# Patient Record
Sex: FEMALE | Race: WHITE | Employment: UNEMPLOYED | URBAN - METROPOLITAN AREA
[De-identification: names, ages, dates, MRNs, and addresses within clinical notes are randomized per-mention and may not be internally consistent; named-entity substitution may affect disease eponyms.]

---

## 2017-07-21 LAB
ANTIBODY SCREEN, EXTERNAL: NEGATIVE
CHLAMYDIA, EXTERNAL: NEGATIVE
HBSAG, EXTERNAL: NEGATIVE
HCT, EXTERNAL: 37.9
HGB, EXTERNAL: 12.4
HIV, EXTERNAL: NEGATIVE
N. GONORRHEA, EXTERNAL: NEGATIVE
RUBELLA, EXTERNAL: NORMAL
T. PALLIDUM, EXTERNAL: NORMAL
TYPE, ABO & RH, EXTERNAL: NORMAL

## 2017-08-03 ENCOUNTER — HOSPITAL ENCOUNTER (OUTPATIENT)
Dept: PERINATAL CARE | Age: 22
Discharge: HOME | End: 2017-08-03
Attending: OBSTETRICS & GYNECOLOGY
Payer: MEDICAID

## 2017-08-03 PROCEDURE — 76811 OB US DETAILED SNGL FETUS: CPT | Performed by: OBSTETRICS & GYNECOLOGY

## 2017-08-31 ENCOUNTER — HOSPITAL ENCOUNTER (OUTPATIENT)
Dept: PERINATAL CARE | Age: 22
Discharge: HOME | End: 2017-08-31
Attending: OBSTETRICS & GYNECOLOGY
Payer: MEDICAID

## 2017-08-31 PROCEDURE — 76816 OB US FOLLOW-UP PER FETUS: CPT | Performed by: OBSTETRICS & GYNECOLOGY

## 2017-08-31 PROCEDURE — 76817 TRANSVAGINAL US OBSTETRIC: CPT | Performed by: OBSTETRICS & GYNECOLOGY

## 2017-09-01 NOTE — PROGRESS NOTES
Maternal-Fetal Medicine    U/S on 8-31-17 completed to follow-up fetal spine evaluation. Final report pending. 8-31-17 -  g (58%ile), BELLA 14 cm, anterior placenta. Soft-tissue mass (sessile appearance, base 10 mm x 7 mm, protruding away from skin about 12 mm) originating from near the sacrum is identified. Some images suggest fluid sac inside -- possibly concerning for hygroma vs. menginocele -- but no clear attachment to spine or CNS is found. Some views suggest continuation of mass under the skin, possibly suggesting teratoma, but also not easily imaged. The ultrasound is completed remotely. I have reviewed her study in real-time and discussed the findings with Ms. Pebbles Mendez and her . Due to the non-diagnostic ultrasound imaging, I have arranged for MRI of the fetus. The MRI study is scheduled for today (9-1-17, [facility] radiology, 6:15 pm). I have contacted Ms. Pebbles Mendez ([phone]) and discussed the MRI evaluation with her, and she will arrange to arrive at [facility] for evaluation this evening. Additional follow-up and appropriate pediatric service consultations will be arranged after further evaluation is completed.     Johanna Bowen M.D., Ph.D.  Marcelino Clifton

## 2017-09-03 NOTE — PROGRESS NOTES
MARIA E - Please see the Ultrasound report / consult note to be entered into this patient's record as a scanned document from my office. A text copy of this note is also provided below for convenience. Bonifacio Cornejo M.D., Ph.D.  [facility]    ---------------------------------------------------------    Indication: Abnormal ultrasound   testing B76.6, Obesity complicating pregnancy 2nd Tri O99.212.   ____________________________________________________________________________  History: Age: 20 years. : 1 Para: 0.   Current Pregnancy: Blood group: O Rhesus D-positive. Pre- pregnancy data: Weight 270 lbs. Height 5 ft 3 ins. BMI 47.9.  ____________________________________________________________________________  Dating:  Stated EDC:  EDC: 17 GA by stated EDC: 25w1d  Current Scan on: 17 EDC: 12/10/17 GA by current scan: 25w4d  Best Overall Assessment: 17 EDC: 17 Assessed GA: 25w1d  The calculation of the gestational age by current scan was based on BPD, HC, AC and FL. The Best Overall Assessment is based on the stated EDC.  ____________________________________________________________________________  General Evaluation:  Fetal heart activity: present. Fetal heart rate: 144 bpm.   Presentation: BREECH. Fetal movement: visible. Amniotic Fluid: normal. BELLA  14.0 cm. Maximal vertical pocket 5.0 cm. Placenta: anterior. ____________________________________________________________________________  Anatomy Scan:  Ricardo gestation. Biometry:  Fetal Measurements used for the estimation of the gestational age are bolded.   BPD 63.4 mm 61st% 25w5d (25w0d to 26w3d)  .7 mm 41st% 25w4d (23w3d to 27w4d)  .0 mm 78th% 26w3d (25w4d to 27w1d)  FL 45.1 mm 29th% 24w6d (22w6d to 27w0d)  OFD 83.8 mm 68th% 25w4d  TCD 27.9 mm 63rd% 25w4d  CM 6.2 mm 51st%  EFW (lbs/oz) 1 lbs 14 ozs  EFW (g) 844 g  58th%       Fetal Anatomy:  Visualized with normal appearance: head, spine, gastrointestinal tract, bladder. Brain: Visualized and normal appearance: brain parenchyma, cerebral ventricles, choroid plexus, Cisterna Magna, midline falx, cerebellum, cerebellar lobes, posterior fossa, vermis, cavum septi pellucidi. Neck / Skin: Soft-tissue mass (sessile appearance, base 10 mm x 7 mm, protruding away from skin for about 12 mm) originating from near the sacrum is identified. Some images suggest fluid sac inside -- possibly concerning for hygroma vs. menginocele. No clear attachment to spine or CNS is found, but the imaging is not clear, and such as attachment to the CNS cannot be ruled out. Some views suggest continuation of mass under the skin, possibly suggesting teratoma, but this is also not easily imaged. Heart: The 4-chamber view was obtained but outflow tracts could not be adequately visualized. Summary of Ultrasound Findings:  Transabdominal US. U/S machine: PlayDo E8 Expert. U/S view: good. Impression: Lower back / sacral mass noted. ____________________________________________________________________________  Fetal Wellbeing Assessment:  Amniotic fluid: normal. BELLA: 14.0 cm. MVP: 5.0 cm. Q1: 3.1 cm. Q2: 5.0 cm. Q3: 3.2 cm. Q4: 2.7 cm.     ____________________________________________________________________________  Consultation:  Consultant: Dr. Christy Taylor  Ms. Haywood's ultrasound findings were reviewed with her and her partner by telephone in the office today. (She was scheduled for follow-up on a  date/time when an [facility] physician was not in the office. Her obstetrical ultrasound is evaluated remotely in real-time during her study, and telephone conversation was performed immediately after the study.)    Summary of the study (provided as EMR note on 8-31-17): \" g (58%ile), BELLA 14 cm, anterior placenta.   Soft-tissue mass (sessile appearance, base 10 mm x 7 mm, protruding away from skin about 12 mm) originating from near the sacrum is identified. Some images suggest fluid sac inside -- possibly concerning for hygroma vs. meningocele -- but no clear attachment to spine or CNS is found. Some views suggest continuation of mass under the skin, possibly suggesting teratoma, but also not easily imaged. \"Due to the non-diagnostic ultrasound imaging, I have arranged for MRI of the fetus. The MRI study is scheduled for today (17, [facility] radiology, 6:15 pm). I have contacted Ms. Jordan Thibodeaux ([phone]) and discussed the MRI evaluation with her, and she will arrange to arrive at [facility] for evaluation this evening. \"Additional follow-up and appropriate pediatric service consultations will be arranged after further evaluation is completed. \"    -----    I discussed these findings with Ms. Jordan Thibodeaux today (by telephone) and emphasized the presence of these fetal findings appear to be progressive were not present to this extent and they were not detected at the time of her 21-week study in our office. I have reviewed her prior films, when emphasis was on the sacral spine and CNS. I indicated that the current findings are not expected to be life threatening for the fetus/. I indicated that the mass is expected to require surgical correction and that on-going fetal evaluation will assist in preparations for the timing, route, and location of the delivery. I discussed the possibility of sacral teratoma and neural anomaly. Masses in this location of a fetus are expected to be benign, but sometimes surgical resection may have be complicated and result in deficits in the child. At the time of our discussion in the office, the fetal MRI appointment had not been made. Later the same day, I was able to make the appointment for fetal MRI for 2017 at [facility] as noted above. Thank you, very much, for this opportunity to provide consultation for your patient.   If you have any questions or concerns, please do not hesitate to contact our office at your convenience.  ____________________________________________________________________________  Maternal Structures:  Cervical length 45.0 mm.  ____________________________________________________________________________  Report Summary:  Impression: A follow-up study was performed for fetal evaluation and growth. Transabdominal and endovaginal ultrasound evaluations were performed to better evaluate the fetal sacral spine. Image quality is limited by obesity. Ms. Pebbles Mendez is obese and was seen at 21 weeks because of abnormal fetal ultrasound findings (suspected sacral spine anomaly). The evaluation at 21 weeks did not observe any abnormality, but follow-up was arranged to allow for additional evaluation due to the difficulty of prior imaging. Ms. Pebbles Mendez has low risk for fetal Trisomy 21/18 and ONTD (1 in 2734 risk) based on second-trimester serum screening. She has no complaints to report to my staff in the office. This study is remotely interpreted but is evaluated in real-time during the patient's visit to my office. Please see the note above. Single viable breech IUP with composite biometry consistent with dates is observed. EFW is appropriate for gestational age. Fetal anatomy is a soft-tissue mass near the sacrum of the fetus. The mass has a sessile appearance with the base measuring 10 mm x 7 mm and which protrudes from the skin for a length of about 12 mm). Some images suggest fluid sac inside -- possibly concerning for a hygroma vs. meningocele -- but no clear attachment to spine or CNS is found. Some views suggest continuation of mass under the skin, possibly suggesting teratoma, but also not easily imaged. The fetal brain appears normal including a normal evaluation of the posterior fossa. The sacral spine appears normal with no major anomaly, but all anomalies of the sacrum cannot be ruled out presently. Normal fetal movements and amniotic fluid measurements are noted. Recommendations: 1. I have discussed the need for additional evaluation of the fetus and discussed fetal MRI evaluation. I have arranged for MRI evaluation of the fetus. The MRI study is scheduled for Friday (9-1-17, [facility] radiology, 6:15 pm). I have contacted Ms. Jolly oHoper ([phone]) and discussed the MRI evaluation with her, and she will arrange to arrive at [facility] for evaluation this evening. She is provided information to arrange for change of time/date of her appointment if she desires. My office will continue to communicate with the patient regarding the on-going fetal evaluation. 2.  Fetal follow-up by ultrasound in 4 weeks.     9-3-17 - ADDENDUM - FINAL REPORT POSTED ON THIS DATE - [facility]

## 2017-09-08 ENCOUNTER — HOSPITAL ENCOUNTER (OUTPATIENT)
Dept: MRI IMAGING | Age: 22
Discharge: HOME | End: 2017-09-08
Attending: OBSTETRICS & GYNECOLOGY
Payer: MEDICAID

## 2017-09-08 DIAGNOSIS — O35.9XX1: ICD-10-CM

## 2017-09-08 PROCEDURE — 72195 MRI PELVIS W/O DYE: CPT

## 2017-09-08 PROCEDURE — 74712 MRI FETAL SNGL/1ST GESTATION: CPT

## 2017-09-25 LAB — GTT, 1 HR, GLUCOLA, EXTERNAL: 112

## 2017-09-28 ENCOUNTER — HOSPITAL ENCOUNTER (OUTPATIENT)
Dept: PERINATAL CARE | Age: 22
Discharge: HOME | End: 2017-09-28
Attending: OBSTETRICS & GYNECOLOGY
Payer: MEDICAID

## 2017-09-28 PROCEDURE — 76816 OB US FOLLOW-UP PER FETUS: CPT | Performed by: OBSTETRICS & GYNECOLOGY

## 2017-10-30 ENCOUNTER — HOSPITAL ENCOUNTER (OUTPATIENT)
Dept: PERINATAL CARE | Age: 22
Discharge: HOME | End: 2017-10-30
Attending: OBSTETRICS & GYNECOLOGY
Payer: MEDICAID

## 2017-10-30 PROCEDURE — 76816 OB US FOLLOW-UP PER FETUS: CPT | Performed by: OBSTETRICS & GYNECOLOGY

## 2017-11-24 LAB — GRBS, EXTERNAL: POSITIVE

## 2017-11-28 ENCOUNTER — HOSPITAL ENCOUNTER (OUTPATIENT)
Dept: PERINATAL CARE | Age: 22
Discharge: HOME | End: 2017-11-28
Payer: MEDICAID

## 2017-11-28 PROCEDURE — 76816 OB US FOLLOW-UP PER FETUS: CPT | Performed by: OBSTETRICS & GYNECOLOGY

## 2017-12-11 ENCOUNTER — HOSPITAL ENCOUNTER (INPATIENT)
Age: 22
LOS: 4 days | Discharge: HOME | DRG: 540 | End: 2017-12-15
Attending: STUDENT IN AN ORGANIZED HEALTH CARE EDUCATION/TRAINING PROGRAM | Admitting: STUDENT IN AN ORGANIZED HEALTH CARE EDUCATION/TRAINING PROGRAM
Payer: MEDICAID

## 2017-12-11 PROBLEM — Z34.90 PREGNANCY: Status: ACTIVE | Noted: 2017-12-11

## 2017-12-11 LAB
ALBUMIN SERPL-MCNC: 1.9 G/DL (ref 3.5–5)
ALBUMIN/GLOB SERPL: 0.5 {RATIO} (ref 1.1–2.2)
ALP SERPL-CCNC: 149 U/L (ref 45–117)
ALT SERPL-CCNC: 16 U/L (ref 12–78)
ANION GAP SERPL CALC-SCNC: 14 MMOL/L (ref 5–15)
AST SERPL-CCNC: 17 U/L (ref 15–37)
BASOPHILS # BLD: 0 K/UL (ref 0–0.1)
BASOPHILS NFR BLD: 0 % (ref 0–1)
BILIRUB SERPL-MCNC: 0.1 MG/DL (ref 0.2–1)
BUN SERPL-MCNC: 6 MG/DL (ref 6–20)
BUN/CREAT SERPL: 11 (ref 12–20)
CALCIUM SERPL-MCNC: 8.1 MG/DL (ref 8.5–10.1)
CHLORIDE SERPL-SCNC: 105 MMOL/L (ref 97–108)
CO2 SERPL-SCNC: 20 MMOL/L (ref 21–32)
CREAT SERPL-MCNC: 0.56 MG/DL (ref 0.55–1.02)
CREAT UR-MCNC: 26.31 MG/DL
EOSINOPHIL # BLD: 0.1 K/UL (ref 0–0.4)
EOSINOPHIL NFR BLD: 1 % (ref 0–7)
ERYTHROCYTE [DISTWIDTH] IN BLOOD BY AUTOMATED COUNT: 13.7 % (ref 11.5–14.5)
GLOBULIN SER CALC-MCNC: 3.8 G/DL (ref 2–4)
GLUCOSE SERPL-MCNC: 80 MG/DL (ref 65–100)
HCT VFR BLD AUTO: 33 % (ref 35–47)
HGB BLD-MCNC: 10.9 G/DL (ref 11.5–16)
LYMPHOCYTES # BLD: 1.7 K/UL (ref 0.8–3.5)
LYMPHOCYTES NFR BLD: 17 % (ref 12–49)
MCH RBC QN AUTO: 27.7 PG (ref 26–34)
MCHC RBC AUTO-ENTMCNC: 33 G/DL (ref 30–36.5)
MCV RBC AUTO: 84 FL (ref 80–99)
MONOCYTES # BLD: 0.6 K/UL (ref 0–1)
MONOCYTES NFR BLD: 6 % (ref 5–13)
NEUTS SEG # BLD: 7.5 K/UL (ref 1.8–8)
NEUTS SEG NFR BLD: 76 % (ref 32–75)
PLATELET # BLD AUTO: 249 K/UL (ref 150–400)
POTASSIUM SERPL-SCNC: 4.3 MMOL/L (ref 3.5–5.1)
PROT SERPL-MCNC: 5.7 G/DL (ref 6.4–8.2)
PROT UR-MCNC: 26 MG/DL (ref 0–11.9)
PROT/CREAT UR-RTO: 1
RBC # BLD AUTO: 3.93 M/UL (ref 3.8–5.2)
SODIUM SERPL-SCNC: 139 MMOL/L (ref 136–145)
WBC # BLD AUTO: 9.9 K/UL (ref 3.6–11)

## 2017-12-11 PROCEDURE — 86900 BLOOD TYPING SEROLOGIC ABO: CPT | Performed by: STUDENT IN AN ORGANIZED HEALTH CARE EDUCATION/TRAINING PROGRAM

## 2017-12-11 PROCEDURE — 65270000029 HC RM PRIVATE

## 2017-12-11 PROCEDURE — 85025 COMPLETE CBC W/AUTO DIFF WBC: CPT | Performed by: STUDENT IN AN ORGANIZED HEALTH CARE EDUCATION/TRAINING PROGRAM

## 2017-12-11 PROCEDURE — 84156 ASSAY OF PROTEIN URINE: CPT | Performed by: STUDENT IN AN ORGANIZED HEALTH CARE EDUCATION/TRAINING PROGRAM

## 2017-12-11 PROCEDURE — 36415 COLL VENOUS BLD VENIPUNCTURE: CPT | Performed by: STUDENT IN AN ORGANIZED HEALTH CARE EDUCATION/TRAINING PROGRAM

## 2017-12-11 PROCEDURE — 77030005546 HC CATH URETH FOL 3W BARD -A

## 2017-12-11 PROCEDURE — 80053 COMPREHEN METABOLIC PANEL: CPT | Performed by: STUDENT IN AN ORGANIZED HEALTH CARE EDUCATION/TRAINING PROGRAM

## 2017-12-11 RX ORDER — SODIUM CHLORIDE 0.9 % (FLUSH) 0.9 %
5-10 SYRINGE (ML) INJECTION AS NEEDED
Status: DISCONTINUED | OUTPATIENT
Start: 2017-12-11 | End: 2017-12-13

## 2017-12-11 RX ORDER — ACETAMINOPHEN 325 MG/1
500 TABLET ORAL AS NEEDED
COMMUNITY
End: 2017-12-15

## 2017-12-11 RX ORDER — OXYTOCIN IN 5 % DEXTROSE 30/500 ML
.5-3 PLASTIC BAG, INJECTION (ML) INTRAVENOUS
Status: DISCONTINUED | OUTPATIENT
Start: 2017-12-12 | End: 2017-12-13

## 2017-12-11 RX ORDER — ZOLPIDEM TARTRATE 5 MG/1
5 TABLET ORAL
Status: DISCONTINUED | OUTPATIENT
Start: 2017-12-11 | End: 2017-12-11 | Stop reason: SDUPTHER

## 2017-12-11 RX ORDER — ACETAMINOPHEN 325 MG/1
650 TABLET ORAL
Status: DISCONTINUED | OUTPATIENT
Start: 2017-12-11 | End: 2017-12-13

## 2017-12-11 RX ORDER — BUTORPHANOL TARTRATE 1 MG/ML
1 INJECTION INTRAMUSCULAR; INTRAVENOUS
Status: DISCONTINUED | OUTPATIENT
Start: 2017-12-11 | End: 2017-12-13

## 2017-12-11 RX ORDER — ONDANSETRON 2 MG/ML
4 INJECTION INTRAMUSCULAR; INTRAVENOUS
Status: DISCONTINUED | OUTPATIENT
Start: 2017-12-11 | End: 2017-12-13 | Stop reason: HOSPADM

## 2017-12-11 RX ORDER — FLUOXETINE 10 MG/1
30 CAPSULE ORAL DAILY
COMMUNITY
End: 2018-10-01 | Stop reason: ALTCHOICE

## 2017-12-11 RX ORDER — SODIUM CHLORIDE 0.9 % (FLUSH) 0.9 %
5-10 SYRINGE (ML) INJECTION EVERY 8 HOURS
Status: DISCONTINUED | OUTPATIENT
Start: 2017-12-11 | End: 2017-12-13

## 2017-12-11 RX ORDER — SODIUM CHLORIDE, SODIUM LACTATE, POTASSIUM CHLORIDE, CALCIUM CHLORIDE 600; 310; 30; 20 MG/100ML; MG/100ML; MG/100ML; MG/100ML
125 INJECTION, SOLUTION INTRAVENOUS CONTINUOUS
Status: DISCONTINUED | OUTPATIENT
Start: 2017-12-11 | End: 2017-12-13

## 2017-12-11 RX ORDER — ZOLPIDEM TARTRATE 5 MG/1
5 TABLET ORAL
Status: DISCONTINUED | OUTPATIENT
Start: 2017-12-11 | End: 2017-12-13

## 2017-12-11 RX ORDER — OXYTOCIN IN 5 % DEXTROSE 30/500 ML
.5-2 PLASTIC BAG, INJECTION (ML) INTRAVENOUS
Status: DISCONTINUED | OUTPATIENT
Start: 2017-12-11 | End: 2017-12-11

## 2017-12-11 RX ORDER — ACETAMINOPHEN 325 MG/1
650 TABLET ORAL
Status: DISCONTINUED | OUTPATIENT
Start: 2017-12-11 | End: 2017-12-11 | Stop reason: SDUPTHER

## 2017-12-11 RX ADMIN — Medication 10 ML: at 20:01

## 2017-12-11 NOTE — PROGRESS NOTES
1700 - Patient arrived ambulatory from home for IOL r/t increased BP in office.  39w5d gestation. Patient changed into gown and voided for PCR collection. Patient placed on EFM. PIV inserted, labs drawn and sent, PIV flushed. Consents signed. Patient tolerated well. Patient oriented to room. Callbell within reach. 200 - Dr. Janice Parikh at bedside assessing patient. SVE = 1/70/-2. Mohr bulb catheter placed by Dr. Janice Parikh and inflated by this RN with 60mL NS. Patient tolerated well. US adjusted. Amol Vargas from Dr. Janice Parikh for patient to have regular diet until midnight then clear liquids, PCN to being at midnight for GBS+ status, pitocin administration to begin at 0600 tomorrow, monitor x1hr, as long as reassuring FHR EFM can be DC until NST is needed before pitocin administration in AM. Patient agreeable to [address]. Callbell within reach. 1822 - Blood bank called requesting second pink top for type and screen, patient does not need a type and screen at this time, blood bank instructed RN to not change order just no send second pink top if not needed. 1845 - EFM DC as reactive NST noted and reassuring FHR tracing for the last hour. BP reading taken, will continue to monitor. 1908 - Bedside and Verbal shift change report given to Babak Larios RN (oncoming nurse) by Shayna Andrade RN (offgoing nurse). Report included the following information SBAR, Procedure Summary, Intake/Output, MAR, Recent Results and Med Rec Status.

## 2017-12-11 NOTE — H&P
History & Physical    Name: Deepika Malone MRN: [id number]  SSN: [id number]    YOB: 1995  Age: 20 y.o. Sex: female      Subjective: Induction     Estimated Date of Delivery: 17  OB History      Para Term  AB Living    1         SAB TAB Ectopic Molar Multiple Live Births                   Ms. Joycelyn Loyola is admitted with pregnancy at 39w5d for induction of labor due to elevated BPs at term. Prenatal course was complicated by morbid obesity, smoking, fetus with sacral growth, s/p MRI does not appear to involve spinal column. Please see prenatal records for details. Presented to the office today with elevated BPs and intermittent HA. Past Medical History:   Diagnosis Date    Psychiatric problem     depression and OCD     History reviewed. No pertinent surgical history. Social History     Occupational History    Not on file. Social History Main Topics    Smoking status: Current Every Day Smoker     Packs/day: 0.25     Years: 6.00    Smokeless tobacco: Not on file    Alcohol use No    Drug use: No    Sexual activity: Yes     Partners: Male     Birth control/ protection: None     History reviewed. No pertinent family history. No Known Allergies  Prior to Admission medications    Medication Sig Start Date End Date Taking? Authorizing Provider   FLUoxetine (PROZAC) 10 mg capsule Take 30 mg by mouth daily. Indications: Obsessive-Compulsive Disorder, depression   Yes Historical Provider   acetaminophen (TYLENOL) 325 mg tablet Take 500 mg by mouth as needed for Pain. Yes Historical Provider        Review of Systems: A comprehensive review of systems was negative except for that written in the History of Present Illness. Objective:     Vitals:  Vitals:    17 1716   Weight: 139.7 kg (308 lb)   Height: 5' 2\" (1.575 m)        Physical Exam:  Patient without distress.   Heart: Regular rate and rhythm  Lung: normal respiratory effort  Abdomen: soft, nontender, obese Fundus: soft and non tender  Perineum: blood absent, amniotic fluid absent  Cervical Exam: 1 cm dilated    70% effaced    -2 station    Lower Extremities:  - Edema 1+  Membranes:  Intact  Fetal Heart Rate: Reactive  Baseline: 145 per minute  Variability: moderate  Accelerations: yes  Decelerations: none  Uterine contractions: none          Prenatal Labs:   Rh pos, Rubella immune, GBS pos     Impression/Plan:   21yo G1 at 39w 5d     Plan: Admit for induction of labor. Group B Strep positive, will treat prophylactically with penicillin first dose at midnight.     Consents reviewed and signed, questions answered     Elevated BPs -sending labs CBC, CMP, Pr:Cr ratio  Mohr bulb for cervical ripening, pitocin in AM   Morbid obesity (BMI 54)   Fetal sacral abnormality -possible teratoma, MRI neg for spinal column involvement, needs evaluation by peds after delivery     Signed By:  Loyda Farmer DO     December 11, 2017

## 2017-12-11 NOTE — IP AVS SNAPSHOT
Summary of Care Report The Summary of Care report has been created to help improve care coordination. Users with access to [employer] or [address] (Web-based application) may access additional patient information including the Discharge Summary. If you are not currently a [address] user and need more information, please call the number listed below in the [place] [address] section and ask to be connected with Medical Records. Facility Information Name Address Phone [address] [facility] [address] [phone] Patient Information Patient Name Sex ZACH Callejas ([id number]) Female 1995 Discharge Information Admitting Provider Service Area Unit Anuradha Carranza,  / [phone] [address] [facility] 3 Mother Infant / [phone] Discharge Provider Discharge Date/Time Discharge Disposition Destination (none) 12/15/2017 Evening (Pending) AHR (none) Patient Language Language ENGLISH [13] Hospital Problems as of 12/15/2017  Reviewed: 3/6/2012 10:29 AM by Tiffanie Helms Noted - Resolved Last Modified POA Active Problems Pregnancy  2017 - Present 2017 by Anuradha Carranza,  Unknown Entered by Anuradha Carranza,  Non-Hospital Problems as of 12/15/2017  Reviewed: 3/6/2012 10:29 AM by Mikki Helms Noted - Resolved Last Modified Active Problems Seasonal allergic reaction  2010 - Present 2010 by Bianca Hill Entered by Bianca Salt Obese  2010 - Present 2010 by Bianca Hill Entered by Bianca Hill You are allergic to the following No active allergies Current Discharge Medication List  
  
START taking these medications Dose & Instructions Dispensing Information Comments HYDROcodone-acetaminophen 5-325 mg per tablet Commonly known as:  Cale Robbins Dose:  1 Tab Take 1 Tab by mouth every four (4) hours as needed. Max Daily Amount: 6 Tabs. Quantity:  30 Tab Refills:  0  
   
 ibuprofen 800 mg tablet Commonly known as:  MOTRIN Dose:  800 mg Take 1 Tab by mouth every eight (8) hours. Quantity:  40 Tab Refills:  0 CONTINUE these medications which have NOT CHANGED Dose & Instructions Dispensing Information Comments PROzac 10 mg capsule Generic drug:  FLUoxetine Dose:  30 mg Take 30 mg by mouth daily. Indications: Obsessive-Compulsive Disorder, depression Refills:  0 STOP taking these medications Comments TYLENOL 325 mg tablet Generic drug:  acetaminophen Surgery Information ID Date/Time Status Primary Surgeon All Procedures Location [id number] 2017 Complete   [facility] - DO NOT SCHEDULE    
 [id number] 2017 Posted Linda Fischer MD  SECTION [facility] L&D OR Follow-up Information Follow up With Details Comments Contact Info None   None ([phone]) Patient stated that they have no PCP Discharge Instructions Discharge Instructions for  Section Patient ID: 
Connor Sarmiento [id number] 
52 y.o. 
1995 Take Home Medications See Dr Rina Reyes in one week for staple removal and blood pressure check Continue taking your prenatal vitamins if you are breastfeeding. Follow-up care is a key part of your treatment and safety. Be sure to keep all your scheduled appointments. Activity 1. Avoid heavy lifting greater than 10lbs for 2 weeks after your surgery 2. Pelvic rest for 6 weeks, ie, nothing in your vagina for 6 weeks  (no intercourse, tampons, or douching). No tubs for 6 weeks.  
3. No driving for 2 weeks and until you are no longer taking prescription pain medications and you can put your foot on the break in a hurry 4. Limit climbing stairs to only when necessary the first 1-2 weeks. Hold the railing and do not carry your baby up and downstairs at first for safety 5. You may walk as tolerated, though be care to not over-do it. Walking will assist in overall healing, decrease constipation and bloating. Kriss Spry feel better more quickly with some daily movement. Diet Regular diet as tolerated Wound care There are several types of incision closures. 1. If you have metal staples in place when you leave the hospital, please call your doctors office to schedule the staple removal as directed at discharge. 2. If you have steri strips covering your incision, you may remove them as they fall off or be sure to remove them about one week after your surgery. Removing them in the shower may be easier. 3. If you have Dermabond, or skin glue, covering your incision, it will fall off slowly in the next few weeks. You may remove it as it begins to peel off. Additional wound care 1. Clear or reddish drainage from your incision is normal.  It's best to leave it open to the air, but if there is drainage, you may cover the incision lightly with gauze, preferably without tape. 2.  Keep the incision clean and dry. 3. Numbness of the skin at or around your incision is normal and the feeling usually returns gradually. 4. Call your doctor if you have increasing drainage from your incision, an unpleasant odor, red streaks, an increase in pain, or if it appears to open. Pain Management 1. Continue prescription pain medication as written by discharging physician 2. Over the counter medications such as Tylenol and ibuprofen (Motrin or Advil) are also ideal.  These may be taken together or in alternating doses. You may  take the maximum dose:  Motrin or Advil (generic ibuprofen), either 3 tablets every 6 hours or 4 tablets every 8 hours. Your prescription medication conntains a narcotic mixed with Tylenol,so  you should not take any extra Tylenol or acetaminophen until you have reduced your prescription pain medication. The maximum dose is Tylenol or acetominophen 1000 mg every 6 hours (equivalent to 2 Extra Strength Tylenols every 6 hours). 3. After a few days, begin to replace the prescription medication with over the counter medications. Use the prescription medication if needed for more severe pain or at night. The prescription medication can be addictive if overused. 4. Add heating pad or sitz baths as needed. Constipation 1. Constipation is normal after surgery, especially while taking prescription narcotic pain medication. 2. Over the counter remedies including ducosate (Colace), take 1-2 capsules 1-2 times daily for soft stool as needed. You may also add/ try milk of magnesia or rectal remedies such as Dulcolax or Fleets enema. Recovery: What to Expect at [facility] 1. Fatigue is expected. Try to rest when you can and don't worry about doing housework or other tasks which can wait. 2. The soreness in your incision will improve significantly over the first 2 weeks, but it may take 6-8 weeks before you are completely recovered. 3. Back pain or general body aches or muscle soreness are expected and should improve with acetominophen or ibuprofen. 4. Leg swelling due to pregnancy and/or IV fluids given in the hospital will take about two weeks to resolve. 5. Most women experience some form of the \"Baby Blues\" after having a baby. Feeling emotional, tearful, frustrated, anxious, sad, and irritable some of the time is normal and go away after about 2 weeks. Adequate rest and help from your family will help. Take breaks from caring for the baby. Call your doctor if your symptoms seem severe, last more than 2 weeks, or seem to be getting worse instead of better.   Get help immediately if you have thoughts of wanting to hurt yourself or others! Call your doctor or seek immediate medical care if you have: 
Heavy vaginal bleeding, soaking through one or more pads an hour for several hours. Foul-smelling discharge from your vagina or incision. Consistent nausea and vomiting and cannot keep fluids down. Consistent pain that does not get better after you take pain medicine. Sudden chest pain and shortness of breath Signs of a blood clot: pain/ swelling/ increasing redness in your lower extremeties Signs of infection: increased pain in your abdomen or vaginal area; red streaks, warmth, or tenderness of your breasts; fever of 100.5 F or greater Chart Review Routing History Recipient Method Report Sent By Cierra Farmer DO Fax: [phone] Phone: [phone] Fax BASIC PATIENT INFO Milka Dao MD [[id number]] 9/12/2017 12:35 PM   
 Tylor Ordonez MD  
Phone: [phone] In [employer] Routed Notes Loyda Farmer [place] [[id number]] 12/11/2017  5:49 PM 12/11/2017

## 2017-12-11 NOTE — IP AVS SNAPSHOT
[address] 
[phone] Patient: Avinash Davies MRN: [id number] :1995 About your hospitalization You were admitted on:  2017 You last received care in the:  [facility] You were discharged on:  December 15, 2017 Why you were hospitalized Your primary diagnosis was:  Not on File Your diagnoses also included:  Pregnancy Things You Need To Do (next 8 weeks) Follow up with None Where:  None (395) Patient stated that they have no PCP Discharge Orders None A check arlene indicates which time of day the medication should be taken. My Medications STOP taking these medications TYLENOL 325 mg tablet Generic drug:  acetaminophen TAKE these medications as instructed Instructions Each Dose to Equal  
 Morning Noon Evening Bedtime HYDROcodone-acetaminophen 5-325 mg per tablet Commonly known as:  Wayna Glaze Your last dose was: Your next dose is: Take 1 Tab by mouth every four (4) hours as needed. Max Daily Amount: 6 Tabs. 1 Tab  
    
   
   
   
  
 ibuprofen 800 mg tablet Commonly known as:  MOTRIN Your last dose was: Your next dose is: Take 1 Tab by mouth every eight (8) hours. 800 mg PROzac 10 mg capsule Generic drug:  FLUoxetine Your last dose was: Your next dose is: Take 30 mg by mouth daily. Indications: Obsessive-Compulsive Disorder, depression 30 mg Where to Get Your Medications Information on where to get these meds will be given to you by the nurse or doctor. ! Ask your nurse or doctor about these medications HYDROcodone-acetaminophen 5-325 mg per tablet  
 ibuprofen 800 mg tablet Discharge Instructions Discharge Instructions for  Section Patient ID: 
Lupillo Ravi [id number] 
89 y.o. 
1995 Take Home Medications See Dr Vega Guevara in one week for staple removal and blood pressure check Continue taking your prenatal vitamins if you are breastfeeding. Follow-up care is a key part of your treatment and safety. Be sure to keep all your scheduled appointments. Activity 1. Avoid heavy lifting greater than 10lbs for 2 weeks after your surgery 2. Pelvic rest for 6 weeks, ie, nothing in your vagina for 6 weeks  (no intercourse, tampons, or douching). No tubs for 6 weeks. 3. No driving for 2 weeks and until you are no longer taking prescription pain medications and you can put your foot on the break in a hurry 4. Limit climbing stairs to only when necessary the first 1-2 weeks. Hold the railing and do not carry your baby up and downstairs at first for safety 5. You may walk as tolerated, though be care to not over-do it. Walking will assist in overall healing, decrease constipation and bloating. Kriss Spry feel better more quickly with some daily movement. Diet Regular diet as tolerated Wound care There are several types of incision closures. 1. If you have metal staples in place when you leave the hospital, please call your doctors office to schedule the staple removal as directed at discharge. 2. If you have steri strips covering your incision, you may remove them as they fall off or be sure to remove them about one week after your surgery. Removing them in the shower may be easier. 3. If you have Dermabond, or skin glue, covering your incision, it will fall off slowly in the next few weeks. You may remove it as it begins to peel off. Additional wound care 1. Clear or reddish drainage from your incision is normal.  It's best to leave it open to the air, but if there is drainage, you may cover the incision lightly with gauze, preferably without tape. 2.  Keep the incision clean and dry. 3. Numbness of the skin at or around your incision is normal and the feeling usually returns gradually. 4. Call your doctor if you have increasing drainage from your incision, an unpleasant odor, red streaks, an increase in pain, or if it appears to open. Pain Management 1. Continue prescription pain medication as written by discharging physician 2. Over the counter medications such as Tylenol and ibuprofen (Motrin or Advil) are also ideal.  These may be taken together or in alternating doses. You may  take the maximum dose:  Motrin or Advil (generic ibuprofen), either 3 tablets every 6 hours or 4 tablets every 8 hours. Your prescription medication conntains a narcotic mixed with Tylenol,so  you should not take any extra Tylenol or acetaminophen until you have reduced your prescription pain medication. The maximum dose is Tylenol or acetominophen 1000 mg every 6 hours (equivalent to 2 Extra Strength Tylenols every 6 hours). 3. After a few days, begin to replace the prescription medication with over the counter medications. Use the prescription medication if needed for more severe pain or at night. The prescription medication can be addictive if overused. 4. Add heating pad or sitz baths as needed. Constipation 1. Constipation is normal after surgery, especially while taking prescription narcotic pain medication. 2. Over the counter remedies including ducosate (Colace), take 1-2 capsules 1-2 times daily for soft stool as needed. You may also add/ try milk of magnesia or rectal remedies such as Dulcolax or Fleets enema. Recovery: What to Expect at [facility] 1. Fatigue is expected. Try to rest when you can and don't worry about doing housework or other tasks which can wait. 2. The soreness in your incision will improve significantly over the first 2 weeks, but it may take 6-8 weeks before you are completely recovered. 3. Back pain or general body aches or muscle soreness are expected and should improve with acetominophen or ibuprofen. 4. Leg swelling due to pregnancy and/or IV fluids given in the hospital will take about two weeks to resolve. 5. Most women experience some form of the \"Baby Blues\" after having a baby. Feeling emotional, tearful, frustrated, anxious, sad, and irritable some of the time is normal and go away after about 2 weeks. Adequate rest and help from your family will help. Take breaks from caring for the baby. Call your doctor if your symptoms seem severe, last more than 2 weeks, or seem to be getting worse instead of better. Get help immediately if you have thoughts of wanting to hurt yourself or others! Call your doctor or seek immediate medical care if you have: 
Heavy vaginal bleeding, soaking through one or more pads an hour for several hours. Foul-smelling discharge from your vagina or incision. Consistent nausea and vomiting and cannot keep fluids down. Consistent pain that does not get better after you take pain medicine. Sudden chest pain and shortness of breath Signs of a blood clot: pain/ swelling/ increasing redness in your lower extremeties Signs of infection: increased pain in your abdomen or vaginal area; red streaks, warmth, or tenderness of your breasts; fever of 100.5 F or greater [employer] Announcement We are excited to announce that we are making your provider's discharge notes available to you in [employer]. You will see these notes when they are completed and signed by the physician that discharged you from your recent hospital stay. If you have any questions or concerns about any information you see in [employer], please call the Health Information Department where you were seen or reach out to your Primary Care Provider for more information about your plan of care. Introducing [facility]! Maile Norton introduces [employer] patient portal. Now you can access parts of your medical record, email your doctor's office, and request medication refills online. 1. In your internet browser, go to [email or web address] 2. Click on the First Time User? Click Here link in the Sign In box. You will see the New Member Sign Up page. 3. Enter your [employer] Access Code exactly as it appears below. You will not need to use this code after youve completed the sign-up process. If you do not sign up before the expiration date, you must request a new code. · [employer] Access Code: [email or web address] Expires: 2/26/2018  2:10 PM 
 
4. Enter the last four digits of your Social Security Number (xxxx) and Date of Birth (mm/dd/yyyy) as indicated and click Submit. You will be taken to the next sign-up page. 5. Create a [employer] ID. This will be your [employer] login ID and cannot be changed, so think of one that is secure and easy to remember. 6. Create a [employer] password. You can change your password at any time. 7. Enter your Password Reset Question and Answer. This can be used at a later time if you forget your password. 8. Enter your e-mail address. You will receive e-mail notification when new information is available in [address] 9. Click Sign Up. You can now view and download portions of your medical record. 10. Click the Download Summary menu link to download a portable copy of your medical information. If you have questions, please visit the Frequently Asked Questions section of the [employer] website. Remember, [employer] is NOT to be used for urgent needs. For medical emergencies, dial 911. Now available from your iPhone and Android! Providers Seen During Your Hospitalization Provider Specialty Primary office phone Radu Guillen DO Obstetrics & Gynecology [phone] Your Primary Care Physician (PCP) Primary Care Physician Office Phone Office Fax NONE ** None ** ** None ** You are allergic to the following No active allergies Recent Documentation Height Weight Breastfeeding? BMI OB Status Smoking Status 1.575 m 139.7 kg Unknown 56.33 kg/m2 Recent pregnancy Current Every Day Smoker Emergency Contacts Name Discharge Info Relation Home Work Mobile Kristy Guerrero DISCHARGE CAREGIVER [3] Other Relative [6] [phone] [address]  Parent [1] [phone] Bruce Khang  Boyfriend [17] [phone] Patient Belongings The following personal items are in your possession at time of discharge: 
  Dental Appliances: None         Home Medications: None   Jewelry: None  Clothing: With patient    Other Valuables: Cell Phone, María Winston, With patient Please provide this summary of care documentation to your next provider. Signatures-by signing, you are acknowledging that this After Visit Summary has been reviewed with you and you have received a copy. Patient Signature:  ____________________________________________________________ Date:  ____________________________________________________________  
  
Jakob Domínguez Provider Signature:  ____________________________________________________________ Date:  ____________________________________________________________

## 2017-12-11 NOTE — IP AVS SNAPSHOT
[address] 
[phone] Patient: Angela Grubbs MRN: [id number] :1995 My Medications STOP taking these medications TYLENOL 325 mg tablet Generic drug:  acetaminophen TAKE these medications as instructed Instructions Each Dose to Equal  
 Morning Noon Evening Bedtime HYDROcodone-acetaminophen 5-325 mg per tablet Commonly known as:  Lenon Gauze Your last dose was: Your next dose is: Take 1 Tab by mouth every four (4) hours as needed. Max Daily Amount: 6 Tabs. 1 Tab  
    
   
   
   
  
 ibuprofen 800 mg tablet Commonly known as:  MOTRIN Your last dose was: Your next dose is: Take 1 Tab by mouth every eight (8) hours. 800 mg PROzac 10 mg capsule Generic drug:  FLUoxetine Your last dose was: Your next dose is: Take 30 mg by mouth daily. Indications: Obsessive-Compulsive Disorder, depression 30 mg Where to Get Your Medications Information on where to get these meds will be given to you by the nurse or doctor. ! Ask your nurse or doctor about these medications HYDROcodone-acetaminophen 5-325 mg per tablet  
 ibuprofen 800 mg tablet

## 2017-12-12 ENCOUNTER — ANESTHESIA (OUTPATIENT)
Dept: LABOR AND DELIVERY | Age: 22
DRG: 540 | End: 2017-12-12
Payer: MEDICAID

## 2017-12-12 ENCOUNTER — ANESTHESIA EVENT (OUTPATIENT)
Dept: LABOR AND DELIVERY | Age: 22
DRG: 540 | End: 2017-12-12
Payer: MEDICAID

## 2017-12-12 LAB
ABO + RH BLD: NORMAL
ALBUMIN SERPL-MCNC: 1.9 G/DL (ref 3.5–5)
ALBUMIN/GLOB SERPL: 0.5 {RATIO} (ref 1.1–2.2)
ALP SERPL-CCNC: 142 U/L (ref 45–117)
ALT SERPL-CCNC: 9 U/L (ref 12–78)
ANION GAP SERPL CALC-SCNC: 10 MMOL/L (ref 5–15)
AST SERPL-CCNC: 16 U/L (ref 15–37)
BASOPHILS # BLD: 0 K/UL (ref 0–0.1)
BASOPHILS NFR BLD: 0 % (ref 0–1)
BILIRUB SERPL-MCNC: 0.3 MG/DL (ref 0.2–1)
BLOOD GROUP ANTIBODIES SERPL: NORMAL
BUN SERPL-MCNC: 7 MG/DL (ref 6–20)
BUN/CREAT SERPL: 10 (ref 12–20)
CALCIUM SERPL-MCNC: 8 MG/DL (ref 8.5–10.1)
CHLORIDE SERPL-SCNC: 103 MMOL/L (ref 97–108)
CO2 SERPL-SCNC: 22 MMOL/L (ref 21–32)
CREAT SERPL-MCNC: 0.68 MG/DL (ref 0.55–1.02)
EOSINOPHIL # BLD: 0 K/UL (ref 0–0.4)
EOSINOPHIL NFR BLD: 0 % (ref 0–7)
ERYTHROCYTE [DISTWIDTH] IN BLOOD BY AUTOMATED COUNT: 13.8 % (ref 11.5–14.5)
GLOBULIN SER CALC-MCNC: 3.5 G/DL (ref 2–4)
GLUCOSE SERPL-MCNC: 92 MG/DL (ref 65–100)
HCT VFR BLD AUTO: 31.2 % (ref 35–47)
HGB BLD-MCNC: 10.4 G/DL (ref 11.5–16)
LYMPHOCYTES # BLD: 1.1 K/UL (ref 0.8–3.5)
LYMPHOCYTES NFR BLD: 7 % (ref 12–49)
MAGNESIUM SERPL-MCNC: 3.9 MG/DL (ref 1.6–2.4)
MCH RBC QN AUTO: 27.9 PG (ref 26–34)
MCHC RBC AUTO-ENTMCNC: 33.3 G/DL (ref 30–36.5)
MCV RBC AUTO: 83.6 FL (ref 80–99)
MONOCYTES # BLD: 1 K/UL (ref 0–1)
MONOCYTES NFR BLD: 7 % (ref 5–13)
NEUTS SEG # BLD: 13.6 K/UL (ref 1.8–8)
NEUTS SEG NFR BLD: 86 % (ref 32–75)
PLATELET # BLD AUTO: 235 K/UL (ref 150–400)
POTASSIUM SERPL-SCNC: 4 MMOL/L (ref 3.5–5.1)
PROT SERPL-MCNC: 5.4 G/DL (ref 6.4–8.2)
RBC # BLD AUTO: 3.73 M/UL (ref 3.8–5.2)
SODIUM SERPL-SCNC: 135 MMOL/L (ref 136–145)
SPECIMEN EXP DATE BLD: NORMAL
WBC # BLD AUTO: 15.7 K/UL (ref 3.6–11)

## 2017-12-12 PROCEDURE — 75410000002 HC LABOR FEE PER 1 HR: Performed by: OBSTETRICS & GYNECOLOGY

## 2017-12-12 PROCEDURE — 75410000003 HC RECOV DEL/VAG/CSECN EA 0.5 HR: Performed by: OBSTETRICS & GYNECOLOGY

## 2017-12-12 PROCEDURE — 74011250637 HC RX REV CODE- 250/637: Performed by: STUDENT IN AN ORGANIZED HEALTH CARE EDUCATION/TRAINING PROGRAM

## 2017-12-12 PROCEDURE — 83735 ASSAY OF MAGNESIUM: CPT | Performed by: STUDENT IN AN ORGANIZED HEALTH CARE EDUCATION/TRAINING PROGRAM

## 2017-12-12 PROCEDURE — 77030018749 HC HK AMNIO DISP DERY -A

## 2017-12-12 PROCEDURE — 88307 TISSUE EXAM BY PATHOLOGIST: CPT | Performed by: STUDENT IN AN ORGANIZED HEALTH CARE EDUCATION/TRAINING PROGRAM

## 2017-12-12 PROCEDURE — 36415 COLL VENOUS BLD VENIPUNCTURE: CPT | Performed by: STUDENT IN AN ORGANIZED HEALTH CARE EDUCATION/TRAINING PROGRAM

## 2017-12-12 PROCEDURE — 74011000250 HC RX REV CODE- 250

## 2017-12-12 PROCEDURE — 77030010848 HC CATH INTUTR PRSS KOLB -B

## 2017-12-12 PROCEDURE — 76060000078 HC EPIDURAL ANESTHESIA: Performed by: OBSTETRICS & GYNECOLOGY

## 2017-12-12 PROCEDURE — 74011000258 HC RX REV CODE- 258: Performed by: STUDENT IN AN ORGANIZED HEALTH CARE EDUCATION/TRAINING PROGRAM

## 2017-12-12 PROCEDURE — 80053 COMPREHEN METABOLIC PANEL: CPT | Performed by: STUDENT IN AN ORGANIZED HEALTH CARE EDUCATION/TRAINING PROGRAM

## 2017-12-12 PROCEDURE — 3E033VJ INTRODUCTION OF OTHER HORMONE INTO PERIPHERAL VEIN, PERCUTANEOUS APPROACH: ICD-10-PCS | Performed by: OBSTETRICS & GYNECOLOGY

## 2017-12-12 PROCEDURE — 74011250636 HC RX REV CODE- 250/636

## 2017-12-12 PROCEDURE — 74011250636 HC RX REV CODE- 250/636: Performed by: STUDENT IN AN ORGANIZED HEALTH CARE EDUCATION/TRAINING PROGRAM

## 2017-12-12 PROCEDURE — 59200 INSERT CERVICAL DILATOR: CPT | Performed by: OBSTETRICS & GYNECOLOGY

## 2017-12-12 PROCEDURE — 65270000029 HC RM PRIVATE

## 2017-12-12 PROCEDURE — 76010000392 HC C SECN EA ADDL 0.5 HR: Performed by: OBSTETRICS & GYNECOLOGY

## 2017-12-12 PROCEDURE — 76010000391 HC C SECN FIRST 1 HR: Performed by: OBSTETRICS & GYNECOLOGY

## 2017-12-12 PROCEDURE — 74011000250 HC RX REV CODE- 250: Performed by: OBSTETRICS & GYNECOLOGY

## 2017-12-12 PROCEDURE — 10H07YZ INSERTION OF OTHER DEVICE INTO PRODUCTS OF CONCEPTION, VIA NATURAL OR ARTIFICIAL OPENING: ICD-10-PCS | Performed by: OBSTETRICS & GYNECOLOGY

## 2017-12-12 PROCEDURE — 77010026064 HC OXYGEN INFANT MED AIR MIN: Performed by: OBSTETRICS & GYNECOLOGY

## 2017-12-12 PROCEDURE — 77030034850

## 2017-12-12 PROCEDURE — 74011250636 HC RX REV CODE- 250/636: Performed by: ANESTHESIOLOGY

## 2017-12-12 PROCEDURE — 4A1H7CZ MONITORING OF PRODUCTS OF CONCEPTION, CARDIAC RATE, VIA NATURAL OR ARTIFICIAL OPENING: ICD-10-PCS | Performed by: OBSTETRICS & GYNECOLOGY

## 2017-12-12 PROCEDURE — 85025 COMPLETE CBC W/AUTO DIFF WBC: CPT | Performed by: STUDENT IN AN ORGANIZED HEALTH CARE EDUCATION/TRAINING PROGRAM

## 2017-12-12 PROCEDURE — 77030014125 HC TY EPDRL BBMI -B: Performed by: ANESTHESIOLOGY

## 2017-12-12 PROCEDURE — 74011250636 HC RX REV CODE- 250/636: Performed by: OBSTETRICS & GYNECOLOGY

## 2017-12-12 RX ORDER — LIDOCAINE HYDROCHLORIDE AND EPINEPHRINE 20; 5 MG/ML; UG/ML
INJECTION, SOLUTION EPIDURAL; INFILTRATION; INTRACAUDAL; PERINEURAL AS NEEDED
Status: DISCONTINUED | OUTPATIENT
Start: 2017-12-12 | End: 2017-12-13 | Stop reason: HOSPADM

## 2017-12-12 RX ORDER — LIDOCAINE HYDROCHLORIDE AND EPINEPHRINE 15; 5 MG/ML; UG/ML
INJECTION, SOLUTION EPIDURAL AS NEEDED
Status: DISCONTINUED | OUTPATIENT
Start: 2017-12-12 | End: 2017-12-13 | Stop reason: HOSPADM

## 2017-12-12 RX ORDER — LABETALOL HYDROCHLORIDE 5 MG/ML
INJECTION, SOLUTION INTRAVENOUS
Status: COMPLETED
Start: 2017-12-12 | End: 2017-12-12

## 2017-12-12 RX ORDER — LABETALOL HYDROCHLORIDE 5 MG/ML
80 INJECTION, SOLUTION INTRAVENOUS
Status: COMPLETED | OUTPATIENT
Start: 2017-12-12 | End: 2017-12-12

## 2017-12-12 RX ORDER — LABETALOL HYDROCHLORIDE 5 MG/ML
40 INJECTION, SOLUTION INTRAVENOUS
Status: COMPLETED | OUTPATIENT
Start: 2017-12-12 | End: 2017-12-12

## 2017-12-12 RX ORDER — LABETALOL HYDROCHLORIDE 5 MG/ML
20 INJECTION, SOLUTION INTRAVENOUS
Status: COMPLETED | OUTPATIENT
Start: 2017-12-12 | End: 2017-12-12

## 2017-12-12 RX ORDER — MAGNESIUM SULFATE HEPTAHYDRATE 40 MG/ML
2 INJECTION, SOLUTION INTRAVENOUS ONCE
Status: DISCONTINUED | OUTPATIENT
Start: 2017-12-12 | End: 2017-12-12

## 2017-12-12 RX ORDER — LABETALOL HYDROCHLORIDE 5 MG/ML
INJECTION, SOLUTION INTRAVENOUS
Status: DISPENSED
Start: 2017-12-12 | End: 2017-12-12

## 2017-12-12 RX ORDER — MORPHINE SULFATE 0.5 MG/ML
INJECTION, SOLUTION EPIDURAL; INTRATHECAL; INTRAVENOUS AS NEEDED
Status: DISCONTINUED | OUTPATIENT
Start: 2017-12-12 | End: 2017-12-13 | Stop reason: HOSPADM

## 2017-12-12 RX ORDER — HYDRALAZINE HYDROCHLORIDE 20 MG/ML
INJECTION INTRAMUSCULAR; INTRAVENOUS
Status: COMPLETED
Start: 2017-12-12 | End: 2017-12-12

## 2017-12-12 RX ORDER — FENTANYL/BUPIVACAINE/NS/PF 2-1250MCG
1-16 PREFILLED PUMP RESERVOIR EPIDURAL CONTINUOUS
Status: DISCONTINUED | OUTPATIENT
Start: 2017-12-12 | End: 2017-12-13

## 2017-12-12 RX ORDER — EPHEDRINE SULFATE 50 MG/ML
INJECTION, SOLUTION INTRAVENOUS AS NEEDED
Status: DISCONTINUED | OUTPATIENT
Start: 2017-12-12 | End: 2017-12-13 | Stop reason: HOSPADM

## 2017-12-12 RX ORDER — HYDRALAZINE HYDROCHLORIDE 20 MG/ML
10 INJECTION INTRAMUSCULAR; INTRAVENOUS
Status: COMPLETED | OUTPATIENT
Start: 2017-12-12 | End: 2017-12-12

## 2017-12-12 RX ORDER — CALCIUM CARBONATE 200(500)MG
400 TABLET,CHEWABLE ORAL
Status: DISCONTINUED | OUTPATIENT
Start: 2017-12-12 | End: 2017-12-13

## 2017-12-12 RX ORDER — SODIUM CHLORIDE, SODIUM LACTATE, POTASSIUM CHLORIDE, CALCIUM CHLORIDE 600; 310; 30; 20 MG/100ML; MG/100ML; MG/100ML; MG/100ML
500 INJECTION, SOLUTION INTRAVENOUS
Status: COMPLETED | OUTPATIENT
Start: 2017-12-12 | End: 2017-12-12

## 2017-12-12 RX ORDER — OXYTOCIN 10 [USP'U]/ML
INJECTION, SOLUTION INTRAMUSCULAR; INTRAVENOUS AS NEEDED
Status: DISCONTINUED | OUTPATIENT
Start: 2017-12-12 | End: 2017-12-13 | Stop reason: HOSPADM

## 2017-12-12 RX ORDER — MAGNESIUM SULFATE HEPTAHYDRATE 40 MG/ML
4 INJECTION, SOLUTION INTRAVENOUS ONCE
Status: COMPLETED | OUTPATIENT
Start: 2017-12-12 | End: 2017-12-12

## 2017-12-12 RX ORDER — BUPIVACAINE HYDROCHLORIDE 2.5 MG/ML
INJECTION, SOLUTION EPIDURAL; INFILTRATION; INTRACAUDAL AS NEEDED
Status: DISCONTINUED | OUTPATIENT
Start: 2017-12-12 | End: 2017-12-13 | Stop reason: HOSPADM

## 2017-12-12 RX ORDER — ONDANSETRON 2 MG/ML
INJECTION INTRAMUSCULAR; INTRAVENOUS AS NEEDED
Status: DISCONTINUED | OUTPATIENT
Start: 2017-12-12 | End: 2017-12-13 | Stop reason: HOSPADM

## 2017-12-12 RX ORDER — NALOXONE HYDROCHLORIDE 0.4 MG/ML
0.4 INJECTION, SOLUTION INTRAMUSCULAR; INTRAVENOUS; SUBCUTANEOUS AS NEEDED
Status: DISCONTINUED | OUTPATIENT
Start: 2017-12-12 | End: 2017-12-13 | Stop reason: HOSPADM

## 2017-12-12 RX ADMIN — LABETALOL HYDROCHLORIDE 80 MG: 5 INJECTION INTRAVENOUS at 09:07

## 2017-12-12 RX ADMIN — HYDRALAZINE HYDROCHLORIDE 10 MG: 20 INJECTION INTRAMUSCULAR; INTRAVENOUS at 09:42

## 2017-12-12 RX ADMIN — SODIUM CHLORIDE 5 MILLION UNITS: 900 INJECTION, SOLUTION INTRAVENOUS at 00:01

## 2017-12-12 RX ADMIN — SODIUM CHLORIDE, SODIUM LACTATE, POTASSIUM CHLORIDE, AND CALCIUM CHLORIDE: 600; 310; 30; 20 INJECTION, SOLUTION INTRAVENOUS at 21:38

## 2017-12-12 RX ADMIN — PENICILLIN G POTASSIUM 2.5 MILLION UNITS: 20000000 POWDER, FOR SOLUTION INTRAVENOUS at 08:24

## 2017-12-12 RX ADMIN — LIDOCAINE HYDROCHLORIDE AND EPINEPHRINE 2 ML: 15; 5 INJECTION, SOLUTION EPIDURAL at 10:13

## 2017-12-12 RX ADMIN — LIDOCAINE HYDROCHLORIDE AND EPINEPHRINE 5 ML: 20; 5 INJECTION, SOLUTION EPIDURAL; INFILTRATION; INTRACAUDAL; PERINEURAL at 22:55

## 2017-12-12 RX ADMIN — SODIUM CHLORIDE, SODIUM LACTATE, POTASSIUM CHLORIDE, AND CALCIUM CHLORIDE 750 ML: 600; 310; 30; 20 INJECTION, SOLUTION INTRAVENOUS at 10:42

## 2017-12-12 RX ADMIN — SODIUM CHLORIDE, SODIUM LACTATE, POTASSIUM CHLORIDE, AND CALCIUM CHLORIDE 75 ML/HR: 600; 310; 30; 20 INJECTION, SOLUTION INTRAVENOUS at 15:55

## 2017-12-12 RX ADMIN — PENICILLIN G POTASSIUM 2.5 MILLION UNITS: 20000000 POWDER, FOR SOLUTION INTRAVENOUS at 20:25

## 2017-12-12 RX ADMIN — BUPIVACAINE HYDROCHLORIDE 5 ML: 2.5 INJECTION, SOLUTION EPIDURAL; INFILTRATION; INTRACAUDAL at 10:15

## 2017-12-12 RX ADMIN — SODIUM CHLORIDE, SODIUM LACTATE, POTASSIUM CHLORIDE, AND CALCIUM CHLORIDE 125 ML/HR: 600; 310; 30; 20 INJECTION, SOLUTION INTRAVENOUS at 06:05

## 2017-12-12 RX ADMIN — LABETALOL HYDROCHLORIDE 20 MG: 5 INJECTION, SOLUTION INTRAVENOUS at 08:27

## 2017-12-12 RX ADMIN — SODIUM CHLORIDE, SODIUM LACTATE, POTASSIUM CHLORIDE, AND CALCIUM CHLORIDE 125 ML/HR: 600; 310; 30; 20 INJECTION, SOLUTION INTRAVENOUS at 10:43

## 2017-12-12 RX ADMIN — Medication 28 MILLI-UNITS/MIN: at 18:59

## 2017-12-12 RX ADMIN — LABETALOL HYDROCHLORIDE 20 MG: 5 INJECTION INTRAVENOUS at 08:27

## 2017-12-12 RX ADMIN — ONDANSETRON 4 MG: 2 INJECTION INTRAMUSCULAR; INTRAVENOUS at 23:06

## 2017-12-12 RX ADMIN — LIDOCAINE HYDROCHLORIDE AND EPINEPHRINE 10 ML: 20; 5 INJECTION, SOLUTION EPIDURAL; INFILTRATION; INTRACAUDAL; PERINEURAL at 22:53

## 2017-12-12 RX ADMIN — CEFAZOLIN 3 G: 1 INJECTION, POWDER, FOR SOLUTION INTRAMUSCULAR; INTRAVENOUS; PARENTERAL at 22:50

## 2017-12-12 RX ADMIN — PENICILLIN G POTASSIUM 2.5 MILLION UNITS: 20000000 POWDER, FOR SOLUTION INTRAVENOUS at 14:38

## 2017-12-12 RX ADMIN — ONDANSETRON 4 MG: 2 INJECTION INTRAMUSCULAR; INTRAVENOUS at 14:38

## 2017-12-12 RX ADMIN — Medication 10 ML: at 05:06

## 2017-12-12 RX ADMIN — MAGNESIUM SULFATE 2 G/HR: 500 INJECTION, SOLUTION INTRAMUSCULAR; INTRAVENOUS at 17:12

## 2017-12-12 RX ADMIN — LABETALOL HYDROCHLORIDE 40 MG: 5 INJECTION INTRAVENOUS at 08:45

## 2017-12-12 RX ADMIN — FENTANYL 0.2 MG/100ML-BUPIV 0.125%-NACL 0.9% EPIDURAL INJ 10 ML/HR: 2/0.125 SOLUTION at 18:30

## 2017-12-12 RX ADMIN — FENTANYL 0.2 MG/100ML-BUPIV 0.125%-NACL 0.9% EPIDURAL INJ 10 ML/HR: 2/0.125 SOLUTION at 10:14

## 2017-12-12 RX ADMIN — Medication 2 MILLI-UNITS/MIN: at 06:07

## 2017-12-12 RX ADMIN — SODIUM CHLORIDE, SODIUM LACTATE, POTASSIUM CHLORIDE, AND CALCIUM CHLORIDE 500 ML: 600; 310; 30; 20 INJECTION, SOLUTION INTRAVENOUS at 15:37

## 2017-12-12 RX ADMIN — MORPHINE SULFATE 2.5 MG: 0.5 INJECTION, SOLUTION EPIDURAL; INTRATHECAL; INTRAVENOUS at 23:44

## 2017-12-12 RX ADMIN — EPHEDRINE SULFATE 5 MG: 50 INJECTION, SOLUTION INTRAVENOUS at 23:11

## 2017-12-12 RX ADMIN — PENICILLIN G POTASSIUM 2.5 MILLION UNITS: 20000000 POWDER, FOR SOLUTION INTRAVENOUS at 04:33

## 2017-12-12 RX ADMIN — LIDOCAINE HYDROCHLORIDE AND EPINEPHRINE 2 ML: 15; 5 INJECTION, SOLUTION EPIDURAL at 10:12

## 2017-12-12 RX ADMIN — PENICILLIN G POTASSIUM 2.5 MILLION UNITS: 20000000 POWDER, FOR SOLUTION INTRAVENOUS at 17:28

## 2017-12-12 RX ADMIN — MAGNESIUM SULFATE 2 G/HR: 500 INJECTION, SOLUTION INTRAMUSCULAR; INTRAVENOUS at 11:21

## 2017-12-12 RX ADMIN — ACETAMINOPHEN 650 MG: 325 TABLET ORAL at 15:05

## 2017-12-12 RX ADMIN — OXYTOCIN 40 UNITS: 10 INJECTION, SOLUTION INTRAMUSCULAR; INTRAVENOUS at 23:24

## 2017-12-12 RX ADMIN — MAGNESIUM SULFATE HEPTAHYDRATE 4 G: 40 INJECTION, SOLUTION INTRAVENOUS at 10:58

## 2017-12-12 RX ADMIN — ANTACID TABLETS 400 MG: 500 TABLET, CHEWABLE ORAL at 11:45

## 2017-12-12 NOTE — ANESTHESIA PROCEDURE NOTES
Epidural Block    Start time: 12/12/2017 9:55 AM  End time: 12/12/2017 10:16 AM  Performed by: Jessica Pyle  Authorized by: Katia Chaves     Pre-Procedure  Indication: labor epidural    Preanesthetic Checklist: patient identified, risks and benefits discussed, anesthesia consent, timeout performed and anesthesia consent    Timeout Time: 10:00        Epidural:   Patient position:  Seated  Prep region:  Lumbar  Prep: Betadine    Location:  L3-4    Needle and Epidural Catheter:   Needle Type:  Tuohy  Needle Gauge:  17 G  Injection Technique:  Loss of resistance using air  Attempts:  1  Catheter Size:  18 G  Catheter at Skin Depth (cm):  14  Events: no blood with aspiration, no cerebrospinal fluid with aspiration, no paresthesia and negative aspiration test    Test Dose:  Lidocaine 1.5% w/ epi and negative    Assessment:   Catheter Secured:  Tegaderm and tape  Insertion:  Uncomplicated  Patient tolerance:  Patient tolerated the procedure well with no immediate complications

## 2017-12-12 NOTE — PROGRESS NOTES
12/12/17 1:02 PM  CM met with patient this morning to complete initial assessment and to begin discharge planning. Demographics were reviewed and confirmed. Patient's boyfriend/FOB is Stefan Thorpe ([phone]) and he plans to be involved in the daily care of the baby, including her mother Blanche ([phone]). Patient does not work and has family to assist her with the baby when they return home. Patient has car seat, crib, clothing, and other necessary supplies. Patient plans to breastfeed. [address] [place] [address] will provide medical follow up for the baby. Patient has [facility] and Medicaid services and was educated on how to add the baby to both. Care Management Interventions  PCP Verified by CM:  Yes (Dr. Demond Garcia)  Mode of Transport at Discharge: Self  Transition of Care Consult (CM Consult): Discharge Planning  Current Support Network: Relative's Home  Confirm Follow Up Transport: Family  Plan discussed with Pt/Family/Caregiver: Yes  Discharge Location  Discharge Placement: Home with outpatient services  AMADO Benoit

## 2017-12-12 NOTE — PROGRESS NOTES
1905: Verbal bedside SBAR report received from Jazzmine Herrera Rn.  Pt sitting in bed eating dinner. Pt denies need to void or any additional needs at this time. 2310: Discussed POC with Dr. Jorge Jimenez. MD approves with pt showering at 0500 and Pitocin being started by 0600 after reactive NST. MD approves of pt being off monitor until NST after shower. 0002: Mohr bulb removed by RN with gentle traction. Mohr balloon intact. 8483: Pt up to shower    0535: Pt returned to bed from shower and placed on monitor for NST    0707: Bedside and Verbal shift change report given to GURPREET Carey RN (oncoming nurse) by Marleen Dasilva RN (offgoing nurse). Report included the following information SBAR, Kardex, Intake/Output, MAR and Recent Results.

## 2017-12-12 NOTE — PROGRESS NOTES
1374 SBAR recveived at bedside from [facility]; assuming care of pt at this time    Fluids reviewed, Pitocin increased by RN Belle Desai during report; Fetal monitoring reviewed, reassuring. Phoenix Chain in use, lines untangled    BP cuff to fit arm applied at this time, resting /100 pulse 100, respirations even, unlabored at rate of 15 temp 98.0 oral; Ginger ale provided per pt request    Second pressure taken, remains markedly elevated @ 202/98, will take next reading on forearm (due to maternal anatomy, large amount of adipose tissue prevents proper fit of cuff). Pt states she had been instructed to roll onto her right side to have BP taken on upper arm of left arm by previous staff. 0747 Third pressure on forearm at 165/98. Pressures this shift reviewed with Dr Anna Matthews via telephone call; After discussion of readings and interventions, plan of care is: If pressures remain elevated at 138 systolic or greater, call Dr Shandar Edmonds who is on call for the practice until 10AM.  Cuff set to cycle q 20 minutes. Pt informed of plan, family at [facility]; pt and family in agreement. 0873 Dr Shandra Edmonds calls in for update on patient status; plan reviewed as above, Dr Shandra Edmonds to review pressures and RN notes upon arrival to office, will AROM on arrival to floor this morning. 0818 LM on VM  with concern over increasing pressure, request to call JENNIFER Edmonds on floor, received order for Labetalol 20 IV NOW.    0830 SVE by Dr Shandra Edmonds with AROM, FSE and IUPC placement.   BP's continue to be monitored closely    0845 Pressures reviewed with Dr Shandra Edmonds, Received VO for second dose Labetalol per Protocol (40mg) now    0905 Pressures reviewed with Dr Lexa Milton for third dose Labetalol per protocol (80 mg)     Charge JENNIFER Dodson updated on pt BP/treatment progression, request for Printed Protocol for next dose/switch to Hydralazine    0935 Pressures again reviewed with Dr Shandra Edmonds via phone call, received order to proceed with Hydralazine 10 MG IV now, order input    0942 Hydralazine IV push, with /90 at this time    0957 /77    0954 ODIN Mcintosh At bedside for Epidural placement. [address] [facility] Dr Janice Parikh updated on urine output, 0 for last hour, adequate until that time, cath manipulated, flushed with 40  Cc SNS with 35 cc return. Orders received, Input;  Labs and fluid bolus at this time  1554 25 cc urine output at this time, rockwell emptied    Bedside and Verbal shift change report given to Magalie Coughlin RN (oncoming nurse) by Latasha Morales RN (offgoing nurse). Report included the following information SBAR, Procedure Summary, Intake/Output, MAR and Recent Results. Aliya Chou

## 2017-12-12 NOTE — PROGRESS NOTES
Labor Progress Note  Patient seen, fetal heart rate and contraction pattern evaluated, patient examined. Comfortable w/ epidural  Patient Vitals for the past 2 hrs:   BP Pulse SpO2   12/12/17 1811 - - 97 %   12/12/17 1806 - - 97 %   12/12/17 1801 - - 98 %   12/12/17 1800 134/79 98 -   12/12/17 1756 - - 97 %   12/12/17 1751 - - 97 %   12/12/17 1746 - - 98 %   12/12/17 1745 136/81 93 -   12/12/17 1741 - - 97 %   12/12/17 1736 - - 98 %   12/12/17 1731 - - 97 %   12/12/17 1730 124/76 100 -   12/12/17 1726 - - 96 %   12/12/17 1721 - - 97 %   12/12/17 1716 - - 96 %   12/12/17 1715 131/65 94 -   12/12/17 1711 - - 97 %   12/12/17 1706 - - 97 %   12/12/17 1701 - - 97 %   12/12/17 1700 137/76 94 -   12/12/17 1658 - - 94 %   12/12/17 1656 - - 96 %   12/12/17 1651 - - 97 %   12/12/17 1647 136/78 97 -   12/12/17 1646 - - 97 %   12/12/17 1641 - - 97 %   12/12/17 1636 - - 98 %   12/12/17 1631 134/78 98 96 %       Physical Exam:  Cervical Exam:  6 cm dilated    80% effaced    -2 station  ant  Uterine Activity: Frequency: Every 2-5 minutes  Fetal Heart Rate: Reactive  Baseline: 120 per minute  Variability: moderate  Accelerations: yes  Decelerations: variable x1 after SVE  Pit at 26  MVU was 200, now less and pit increasing for adequacy  Mag running  UOP adequate  Labs stable    Assessment/Plan:  IUP 39wks 6 days IOL pre-e severe features by BP criteria   FWB reassuring  Cont pit for adequacy and expectant management  Cont mag and watch BP  Indications for c section briefly reviewed  Number of visitors for patient/ fetal safety reviewed  All ques answered.       Mikey Babin MD

## 2017-12-12 NOTE — PROGRESS NOTES
Labor Progress Note  Patient seen, fetal heart rate and contraction pattern evaluated at 1700. Resting in bed, nausea improved, sipping on water.      Physical Exam:  Vitals:   Vitals:    12/12/17 1647 12/12/17 1651 12/12/17 1656 12/12/17 1658   BP: 136/78      Pulse: 97      Resp:       Temp:       SpO2:  97% 96% 94%   Weight:       Height:             Cervical Exam was not examined     Exam by RN around 1600 7-8/90/-2    Uterine Activity[de-identified] Intensity: strong, MVUs 200-225  Fetal Heart Rate: Reactive  Baseline: 135 per minute  Variability: moderate  Accelerations: yes  Decelerations: none  Pitocin: 20mu/min     Assessment/Plan:  Ms. Navya Haines is admitted with pregnancy at 39w6d undergoing IOL for Pre-E now with severe features     Severe Pre-E -on Magnesium, UOP 25-30cc last 2hrs, CBC, CMP, Mag level WNL/appropriate, has been having some N&V, BPs mild range  Fetal tracing Category 1  Continue pitocin, making change, recheck in 2-4hrs or PRN     Destinee Hanks DO  12/12/2017  5:13 PM

## 2017-12-12 NOTE — ANESTHESIA PREPROCEDURE EVALUATION
Anesthetic History   No history of anesthetic complications            Review of Systems / Medical History  Patient summary reviewed, nursing notes reviewed and pertinent labs reviewed    Pulmonary          Smoker         Neuro/Psych             Comments: anxiety Cardiovascular                  Exercise tolerance: >4 METS     GI/Hepatic/Renal     GERD           Endo/Other        Morbid obesity     Other Findings              Physical Exam    Airway  Mallampati: IV  TM Distance: 4 - 6 cm  Neck ROM: normal range of motion        Cardiovascular  Regular rate and rhythm,  S1 and S2 normal,  no murmur, click, rub, or gallop  Rhythm: regular  Rate: normal         Dental    Dentition: Poor dentition     Pulmonary  Breath sounds clear to auscultation               Abdominal  GI exam deferred       Other Findings            Anesthetic Plan    ASA: 3  Anesthesia type: epidural      Post-op pain plan if not by surgeon: indwelling epidural catheter      Anesthetic plan and risks discussed with: Patient and Family      Late entry - preop done, questions answered, and consent obtained prior to procedure; note entered after procedure at 10:23 AM.

## 2017-12-12 NOTE — PROGRESS NOTES
Labor Progress Note  Patient seen, fetal heart rate and contraction pattern evaluated at 1015. Just got epidural. CTXs have been moderately painful.      Physical Exam:  Vitals:   Vitals:    12/12/17 1011 12/12/17 1014 12/12/17 1016 12/12/17 1017   BP: 142/85 127/75  122/69   Pulse: (!) 111 (!) 105  (!) 109   Resp:       Temp:       SpO2: 97%  98%    Weight:       Height:             Cervical Exam was examined   6 cm dilated    90% effaced    0 station    Presenting Part: cephalic    Uterine Activity[de-identified]  Frequency: Every 2 minutes  Fetal Heart Rate: Reactive  Baseline: 145 per minute  Variability: moderate  Accelerations: yes  Decelerations: none  Pitocin: 10mu/min     Assessment/Plan:  Ms. David Morin is admitted with pregnancy at 39w6d undergoing IOL for Pre-E now with severe features     Severe range BPs ultimately responded after 3 doses of IV labetalol and 1 dose of IV hydralazine  Epidural in place  Will start Magnesium for Seizure PPx   Fetal tracing Category 1  Continue pitocin augmentation, titrate to adequate MVUs, peanut ball and position changes   Recheck in 4hrs or PRN     Gerson Baumann DO  12/12/2017  10:22 AM

## 2017-12-12 NOTE — PROGRESS NOTES
Labor Progress Note  Patient seen, fetal heart rate and contraction pattern evaluated, patient examined. comfortable  Patient Vitals for the past 2 hrs:   BP Pulse SpO2   12/12/17 0841 (!) 177/96 91 98 %   12/12/17 0835 (!) 194/99 93 -   12/12/17 0832 - - 98 %   12/12/17 0827 (!) 194/109 (!) 105 97 %   12/12/17 0812 (!) 182/102 (!) 104 97 %   12/12/17 0757 (!) 171/107 - -   12/12/17 0741 (!) 165/98 90 -   12/12/17 0721 (!) 202/98 93 -   12/12/17 0715 (!) 186/100 100 -       Physical Exam:  Cervical Exam:  7-8 cm dilated    80% effaced    -2 station    Post  AROM mod amount clear fluid  FSE/ IUPC placed  Exam difficult/ inhibited by body habitus  Uterine Activity: irreg Q2-5  Fetal Heart Rate: Reactive  Baseline: 120 per minute  Variability: moderate  Accelerations: yes  Decelerations: none  Pit at 8    Assessment/Plan:  IUP 39wks 6 days pre-e IOL now w/ elevated BP BMI 54   FWB reassuring  Cont expectant management, increase pit/ internals for monitoring  Cont PCN s/p x2 overnight  Pre-e- cont protocol for HTN, watch closely  Reviewed with patient, all ques answered, she understands and agrees.     Eric Lawrence MD

## 2017-12-13 LAB
ERYTHROCYTE [DISTWIDTH] IN BLOOD BY AUTOMATED COUNT: 14.2 % (ref 11.5–14.5)
HCT VFR BLD AUTO: 28.2 % (ref 35–47)
HGB BLD-MCNC: 9.3 G/DL (ref 11.5–16)
MCH RBC QN AUTO: 27.7 PG (ref 26–34)
MCHC RBC AUTO-ENTMCNC: 33 G/DL (ref 30–36.5)
MCV RBC AUTO: 83.9 FL (ref 80–99)
PLATELET # BLD AUTO: 200 K/UL (ref 150–400)
RBC # BLD AUTO: 3.36 M/UL (ref 3.8–5.2)
WBC # BLD AUTO: 13.4 K/UL (ref 3.6–11)

## 2017-12-13 PROCEDURE — 85027 COMPLETE CBC AUTOMATED: CPT | Performed by: OBSTETRICS & GYNECOLOGY

## 2017-12-13 PROCEDURE — 77030008467 HC STPLR SKN COVD -B

## 2017-12-13 PROCEDURE — 74011250636 HC RX REV CODE- 250/636: Performed by: ANESTHESIOLOGY

## 2017-12-13 PROCEDURE — 74011250637 HC RX REV CODE- 250/637: Performed by: OBSTETRICS & GYNECOLOGY

## 2017-12-13 PROCEDURE — 65270000029 HC RM PRIVATE

## 2017-12-13 PROCEDURE — 36415 COLL VENOUS BLD VENIPUNCTURE: CPT | Performed by: OBSTETRICS & GYNECOLOGY

## 2017-12-13 PROCEDURE — 74011250636 HC RX REV CODE- 250/636: Performed by: OBSTETRICS & GYNECOLOGY

## 2017-12-13 RX ORDER — SODIUM CHLORIDE, SODIUM LACTATE, POTASSIUM CHLORIDE, CALCIUM CHLORIDE 600; 310; 30; 20 MG/100ML; MG/100ML; MG/100ML; MG/100ML
75 INJECTION, SOLUTION INTRAVENOUS CONTINUOUS
Status: DISPENSED | OUTPATIENT
Start: 2017-12-13 | End: 2017-12-14

## 2017-12-13 RX ORDER — PEPPERMINT OIL
SPIRIT ORAL
Status: DISPENSED
Start: 2017-12-13 | End: 2017-12-14

## 2017-12-13 RX ORDER — DOCUSATE SODIUM 100 MG/1
100 CAPSULE, LIQUID FILLED ORAL 2 TIMES DAILY
Status: DISCONTINUED | OUTPATIENT
Start: 2017-12-13 | End: 2017-12-15 | Stop reason: HOSPADM

## 2017-12-13 RX ORDER — HYDROCODONE BITARTRATE AND ACETAMINOPHEN 5; 325 MG/1; MG/1
1 TABLET ORAL
Status: DISCONTINUED | OUTPATIENT
Start: 2017-12-12 | End: 2017-12-15 | Stop reason: HOSPADM

## 2017-12-13 RX ORDER — OXYTOCIN/RINGER'S LACTATE 20/1000 ML
125-500 PLASTIC BAG, INJECTION (ML) INTRAVENOUS ONCE
Status: COMPLETED | OUTPATIENT
Start: 2017-12-13 | End: 2017-12-13

## 2017-12-13 RX ORDER — FLUOXETINE 10 MG/1
30 CAPSULE ORAL DAILY
Status: DISCONTINUED | OUTPATIENT
Start: 2017-12-13 | End: 2017-12-15 | Stop reason: HOSPADM

## 2017-12-13 RX ORDER — SODIUM CHLORIDE 0.9 % (FLUSH) 0.9 %
5-10 SYRINGE (ML) INJECTION EVERY 8 HOURS
Status: DISCONTINUED | OUTPATIENT
Start: 2017-12-13 | End: 2017-12-15 | Stop reason: HOSPADM

## 2017-12-13 RX ORDER — NALOXONE HYDROCHLORIDE 0.4 MG/ML
0.4 INJECTION, SOLUTION INTRAMUSCULAR; INTRAVENOUS; SUBCUTANEOUS AS NEEDED
Status: DISCONTINUED | OUTPATIENT
Start: 2017-12-12 | End: 2017-12-15 | Stop reason: HOSPADM

## 2017-12-13 RX ORDER — OXYCODONE HYDROCHLORIDE 5 MG/1
5 TABLET ORAL
Status: ACTIVE | OUTPATIENT
Start: 2017-12-13 | End: 2017-12-14

## 2017-12-13 RX ORDER — NALOXONE HYDROCHLORIDE 0.4 MG/ML
0.2 INJECTION, SOLUTION INTRAMUSCULAR; INTRAVENOUS; SUBCUTANEOUS
Status: DISCONTINUED | OUTPATIENT
Start: 2017-12-13 | End: 2017-12-15 | Stop reason: HOSPADM

## 2017-12-13 RX ORDER — SIMETHICONE 80 MG
80 TABLET,CHEWABLE ORAL AS NEEDED
Status: DISCONTINUED | OUTPATIENT
Start: 2017-12-12 | End: 2017-12-15 | Stop reason: HOSPADM

## 2017-12-13 RX ORDER — ZOLPIDEM TARTRATE 5 MG/1
5 TABLET ORAL
Status: DISCONTINUED | OUTPATIENT
Start: 2017-12-13 | End: 2017-12-15 | Stop reason: HOSPADM

## 2017-12-13 RX ORDER — SODIUM CHLORIDE 0.9 % (FLUSH) 0.9 %
5-10 SYRINGE (ML) INJECTION AS NEEDED
Status: DISCONTINUED | OUTPATIENT
Start: 2017-12-12 | End: 2017-12-15 | Stop reason: HOSPADM

## 2017-12-13 RX ORDER — DIPHENHYDRAMINE HCL 25 MG
25 CAPSULE ORAL
Status: DISCONTINUED | OUTPATIENT
Start: 2017-12-12 | End: 2017-12-15 | Stop reason: HOSPADM

## 2017-12-13 RX ORDER — ACETAMINOPHEN 325 MG/1
650 TABLET ORAL
Status: DISCONTINUED | OUTPATIENT
Start: 2017-12-12 | End: 2017-12-15 | Stop reason: HOSPADM

## 2017-12-13 RX ORDER — OXYCODONE HYDROCHLORIDE 5 MG/1
10 TABLET ORAL
Status: ACTIVE | OUTPATIENT
Start: 2017-12-13 | End: 2017-12-14

## 2017-12-13 RX ORDER — KETOROLAC TROMETHAMINE 30 MG/ML
30 INJECTION, SOLUTION INTRAMUSCULAR; INTRAVENOUS
Status: DISPENSED | OUTPATIENT
Start: 2017-12-13 | End: 2017-12-14

## 2017-12-13 RX ORDER — ENOXAPARIN SODIUM 100 MG/ML
40 INJECTION SUBCUTANEOUS EVERY 12 HOURS
Status: DISCONTINUED | OUTPATIENT
Start: 2017-12-13 | End: 2017-12-15 | Stop reason: HOSPADM

## 2017-12-13 RX ORDER — IBUPROFEN 800 MG/1
800 TABLET ORAL EVERY 8 HOURS
Status: DISCONTINUED | OUTPATIENT
Start: 2017-12-13 | End: 2017-12-15 | Stop reason: HOSPADM

## 2017-12-13 RX ADMIN — DOCUSATE SODIUM 100 MG: 100 CAPSULE, LIQUID FILLED ORAL at 21:58

## 2017-12-13 RX ADMIN — Medication 2 G/HR: at 04:43

## 2017-12-13 RX ADMIN — Medication 1500 MILLI-UNITS/HR: at 02:28

## 2017-12-13 RX ADMIN — KETOROLAC TROMETHAMINE 30 MG: 30 INJECTION, SOLUTION INTRAMUSCULAR at 08:49

## 2017-12-13 RX ADMIN — FLUOXETINE HYDROCHLORIDE 30 MG: 20 CAPSULE ORAL at 12:00

## 2017-12-13 RX ADMIN — ENOXAPARIN SODIUM 40 MG: 40 INJECTION SUBCUTANEOUS at 21:19

## 2017-12-13 RX ADMIN — DOCUSATE SODIUM 100 MG: 100 CAPSULE, LIQUID FILLED ORAL at 08:49

## 2017-12-13 RX ADMIN — HYDROCODONE BITARTRATE AND ACETAMINOPHEN 1 TABLET: 5; 325 TABLET ORAL at 21:53

## 2017-12-13 RX ADMIN — KETOROLAC TROMETHAMINE 30 MG: 30 INJECTION, SOLUTION INTRAMUSCULAR at 18:42

## 2017-12-13 RX ADMIN — SIMETHICONE CHEW TAB 80 MG 80 MG: 80 TABLET ORAL at 21:58

## 2017-12-13 RX ADMIN — Medication 2 G/HR: at 10:43

## 2017-12-13 NOTE — PROGRESS NOTES
Labor Progress Note  Patient seen, fetal heart rate and contraction pattern evaluated, patient examined. Patient Vitals for the past 2 hrs:   BP Temp Pulse Resp SpO2   12/12/17 2215 131/76 - (!) 107 - -   12/12/17 2200 140/80 - 100 - -   12/12/17 2145 138/79 - (!) 105 - -   12/12/17 2130 99/50 - 98 - -   12/12/17 2115 124/59 97.9 °F (36.6 °C) (!) 109 16 -   12/12/17 2101 138/73 - 94 - 96 %   12/12/17 2045 134/69 - 93 - -       Physical Exam:  Cervical Exam:  No change  Uterine Activity: Frequency: Every 2-5 minutes  Fetal Heart Rate: Reactive  Baseline: 120 per minute  Variability: moderate  Accelerations: yes  Decelerations: none  Pit 22 to off  Mag and PCN    Assessment/Plan:  IUP 39wks 6days pre-e severe features failed IOL BMI 54   FWB reassuring  No cervical change, recommend c/s as noted in prior note. Agrees to proceed, ques answered, consented.   Anes/ nursing updated, cont SCDs, add IV abx  Proceed to Don Nazario MD

## 2017-12-13 NOTE — L&D DELIVERY NOTE
Operative Note    Name: Kimberly Boykin   Medical Record Number: [id number]   YOB: 1995  Today's Date: 2017      Pre-operative Diagnosis: IUP 39wks pre-e severe morbid obesity BMI 54 failed IOL    Post-operative Diagnosis: same, delivered    Procedure: primary LTCS    Surgeon(s):  MD Aishwarya Davis MD    Anesthesia: epidural     EBL: 600cc    Prophylactic Antibiotics: Ancef  DVT Prophylaxis: Sequential Compression Devices         Fetal Description: castro     Birth Information:   Information for the patient's :  Garrick Marrero, Male [[id number]]   Delivery of a 3.459 kg Male [2] infant on 2017 at 11:25 PM. Apgars were 6 and 8. Umbilical Cord:     Umbilical Cord Events:     Placenta:  removal with  appearance. Amniotic Fluid Volume:        Amniotic Fluid Description:  Clear        Umbilical Cord: 3 vessels present    Placenta:  manual removal    Additional intraoperative findings: morbid obesity ttiD7nh deep. Viable male apgars 6 and 8, 7lb 10oz.  Fetal sacral neoplasm    Specimens: placenta to path           Complications: none    Stable to PACU    Aishwarya Smith MD  2017  12:07 AM

## 2017-12-13 NOTE — LACTATION NOTE
This note was copied from a baby's chart. Discussed with mother her plan for feeding. Reviewed the benefits of exclusive breast milk feeding during the hospital stay. Informed her of the risks of using formula to supplement in the first few days of life as well as the benefits of successful breast milk feeding; referred her to the Breastfeeding booklet about this information. She acknowledges understanding of information reviewed and states that it is her plan to BF her infant. Will support her choice and offer additional information as needed. Pt will successfully establish breastfeeding by feeding in response to early feeding cues   or wake every 3h, will obtain deep latch, and will keep log of feedings/output. Taught to BF at hunger cues and or q 2-3 hrs and to offer 10-20 drops of hand expressed colostrum at any non-feeds.       Breast Assessment  Left Breast: Medium  Left Nipple: Everted, Intact  Right Breast: Medium  Right Nipple: Everted, Intact  Breast- Feeding Assessment  Attends Breast-Feeding Classes: No (Family's feeding goals discussed, BF basics, how milk is made + normal  BF behaviors shared)  Breast-Feeding Experience: No  Breast Trauma/Surgery: No  Type/Quality: Good (Infant comfortably nursing in football hold)  Lactation Consultant Visits  Breast-Feedings: Good   Mother/Infant Observation  Mother Observation: Alignment, Breast comfortable, Recognizes feeding cues, Sleepy after feeding, Nipple round on release, Lets baby end feeding (BF basics reviewed, how to ID markers of milk exchange reviewed)  Infant Observation: Rhythmic suck, Relaxed after feeding, Feeding cues, Latches nipple and aereolae, Lips flanged, lower, Lips flanged, upper, Opens mouth  LATCH Documentation  Latch: Grasps breast, tongue down, lips flanged, rhythmic sucking  Audible Swallowing: A few with stimulation  Type of Nipple: Everted (after stimulation)  Comfort (Breast/Nipple): Soft/non-tender  Hold (Positioning): Full assist, teach one side, mother does other, staff holds  [facility] Score: 8  Hand Expression Education:  Mom taught how to manually hand express her colostrum. Emphasized the importance of providing infant with valuable colostrum as infant rests skin to skin at breast.  Aware to avoid extended periods of non-feeding. Aware to offer 10-20+ drops of colostrum every 2-3 hours until infant is latching and nursing effectively. Taught the rationale behind this low tech but highly effective evidence based practice.

## 2017-12-13 NOTE — PROGRESS NOTES
DR. Cedric Davis at bedside   2233- Dr. Cedric Davis at bedside, no cervical change, c/s called, IUPC removed  2253- Dr. Alis De La Rosa at bedside, pt to OR for c/s  2258-135 FHT'sin OR, FSE removed    2345- Verbally told by MD not to draw CMP or magnesium level in am only the [facility]- cbs drawn and sent    0705- Report given to CLARISA Celis RN

## 2017-12-13 NOTE — PROGRESS NOTES
0700:  Received report from Daysi Price RN at this time, assumed care of pt.    1100:  Pt up out of bed at this time and sitting in the chair with call bell within reach and mother sitting beside her.      1327:  Spoke with Roscoe MODI at this time, informed MD of pt blood pressures and pt urine output of between  mls consistently all day. Per Roscoe MODI the nurse can stop the pts magnesium infusion at 1500 but would like for the nurse to keep the pts rockwell catheter in. Per MD of the pt continues to have adequate urine output at 1800, then the nurse can remove the pts rockwell catheter. TORB no other orders received at this time. 1830:  Pt up out of bed and sitting in chair at this time eating dinner. 1915:  Reported off to Nolan Bazzi RN at this time.

## 2017-12-13 NOTE — OP NOTES
[address] REPORT    Lulu Tineo  MR#: [id number]  : 1995  ACCOUNT #: [de-identified]   DATE OF SERVICE: 2017    SURGEON:  Mary Frank MD    PREOPERATIVE DIAGNOSES:    1. Intrauterine pregnancy at 39 weeks and 6 days. 2.  Morbid obesity with a BMI of 54.   3.  Preeclampsia with severe features. 4.  Failed induction of labor. POSTOPERATIVE DIAGNOSES:   1. Intrauterine pregnancy at 39 weeks and 6 days. 2.  Morbid obesity with a BMI of 54.   3.  Preeclampsia with severe features. 4.  Failed induction of labor. 5.  Delivered. PROCEDURE:  Primary transverse  section. ASSISTANT:  Ina Brewer MD    ANESTHESIA:  Epidural.     ESTIMATED BLOOD LOSS:  600 mL. IV FLUIDS:  1600 mL crystalloid. URINE OUTPUT:  75 mL clear. COMPLICATIONS:  None. SPECIMENS:  Placenta to pathology. INTRAOPERATIVE FINDINGS:  Viable male infant in vertex presentation with Apgars of 6 and 8. Weight 7 pounds 8 ounces. Normal ovaries, uterus, and tubes. Fetal sacral neoplasm noted. Morbid obesity with 9 cm deep subcutaneous tissue and obesity occluding operative field and complicating surgery. INDICATIONS:  Ms. Rachna Reaves is a 25-year-old G1, P0 who was admitted to labor and delivery the night prior to her delivery for induction, given hypertension that ruled in for preeclampsia without severe features. She underwent a Mohr bulb Pitocin and amniotomy induction of labor and received penicillin for group B Strep prophylaxis shortly after her Mohr bulb was placed. Her pressures ruled in for preeclampsia with severe features shortly after amniotomy and she received magnesium prophylaxis with unchanged preeclampsia labs intrapartum. After over 12 hours of Pitocin induction after amniotomy, there was no cervical change, and the decision was made to proceed with  section for failed induction of labor.       PROCEDURE:  The patient was taken to the operating room where her epidural anesthesia was dosed to a surgical level. She was prepped and draped in normal sterile fashion in dorsal supine position with leftward tilt. A Pfannenstiel skin incision was made 2 fingerbreadths above the pubic symphysis though the pubic symphysis was hard to palpate given the patient's morbid obesity. The incision was carried down through 9 cm of subcutaneous tissue to the underlying layer of fascia. The fascia was incised at the midline and the incision was extended bilaterally. The superior aspect of the fascial incision was grasped with Kocher clamps and elevated and the underlying rectus muscles were dissected off both bluntly and sharply. The Kochers remained on the fascia to ensure visualization of this layer. The same was done for the inferior aspect of the fascial incision and a Jovanni Beverage remained on the fascia to ensure visualization of the fascia. The rectus muscles were  at the midline with a scalpel and the abdominal cavity was entered bluntly with excellent visualization of underlying structures, namely bowel, bladder and uterus. The vesicouterine peritoneum was identified, entered sharply, and a bladder flap was created manually and the bladder blade was readjusted. Low transverse incision was made on the uterus, clear fluid was noted at amniotomy, and a viable male infant was delivered from vertex presentation without difficulty. His cord was clamped and cut and he was handed off to the waiting pediatric team. An expected 2-3cm fetal sacral neoplasm was noted. The placenta was delivered with manual assistance and the uterus was exteriorized and cleared of clots and debris. The uterine incision was repaired with some difficulty given the patient's body habitus. It was repaired with 0 Vicryl in a running locked fashion. Two additional figure-of-8 sutures were placed at the midline and the left end to ensure hemostasis. Once this was done, the uterus was returned to the abdomen and the gutters were cleared of clots and debris. The hysterotomy was reinspected and noted to be hemostatic. The peritoneum and muscles were reapproximated with 2-0 Vicryl in a running fashion in 1 layer. The superior and inferior fascial planes were inspected and noted to be hemostatic. The fascia was reapproximated with 0 loop PDS in a running fashion. The fascia was palpated and closed and intact at the completion of this layer. The subcutaneous tissue was irrigated, noted to be hemostatic, and closed in 3 layers with 3-0 Vicryl in a running fashion with each layer. The skin was closed with staples. The patient tolerated the procedure well. All sponge, lap and needle counts are correct x2 and she was taken to the recovery room in stable condition. She received Ancef 3 grams IV prior to incision and SCDs upon entry into operating room. Her magnesium was stopped prior to surgery and will be restarted upon entry into the recovery room.       MD PEYTON Dawson / ALPA  D: 12/13/2017 00:16     T: 12/13/2017 08:29  JOB #: [id number]

## 2017-12-13 NOTE — DISCHARGE SUMMARY
Patient ID:  Jose Aus  [id number]  28 y.o.  1995    Admit Date: 2017    Discharge Date: 12/15/17    Admitting Physician: Rob Nobles DO    Attending Physician: Rob Nobles DO    Admission Diagnoses: Pregnancy    Procedures for this admission: Procedure(s):   SECTION    Discharge Diagnoses: Same as above with primary LTCS producing a viable infant. Information for the patient's :  Tracy Siegel [[id number]]   One Minute Apgar: 6 (Filed from Delivery Summary)  Five  Minute Apgar: 8 (Filed from Delivery Summary)        Discharge Disposition:  good    Discharge Condition:  good    Additional Diagnoses: IUP 39wks HTN ruled in pre-e IOL. severe intrapartum, failed IOL. Maternal Labs:   Lab Results   Component Value Date/Time    HBsAg, External negative 2017    HIV, External negative 2017    Rubella, External immune 2017    GrBStrep, External positive 2017       Cord Blood Results:   Information for the patient's :  Tracy Siegel [[id number]]   No results found for: PCTABR, PCTDIG, BILI, ABORH          History of Present Illness:   OB History      Para Term  AB Living    1         SAB TAB Ectopic Molar Multiple Live Births                 Admitted for induction of labor. Hospital Course:   Patient was admitted as above and delivered via primary LTCS . Please the chart for details. The postpartum course was unremarkable. She was deemed stable for discharge home on day 3.     Follow-up Care:  for staple removal/ BPcheck        Signed:  Gabe Vallejo MD  2017  11:56 PM

## 2017-12-13 NOTE — PROGRESS NOTES
Labor Progress Note  Patient seen, fetal heart rate and contraction pattern evaluated, patient examined. comfortable  No data found. Physical Exam:  Cervical Exam:  unchanged  Uterine Activity: Frequency: Every 2-5 minutes  Fetal Heart Rate: Reactive  Baseline: 120 per minute  Variability: moderate  Accelerations: yes  Decelerations: none  Pit at 22  MVU not adequate  Mag 2gm/hr  UOP adequate    Assessment/Plan:  IUP 39wks 6 days pre-e severe IOL   FWB reassuring  No cervical change since last SVE, essentially unchanged since AROM this am.   Lengthy disc with patient and family re: plan and failed IOL at this point. Recommend c/s now for failed IOL vs recheck SVE one hour if prefers to wait given reassuring fetal/ maternal status. INdications/ expectations/ logistics of c/s reviewed. PARQ held re: risks/benefits, including, but not limited to bleeding, infection, damage to internal/ surrounding organs, thrombosis, blood transfusion. Do not believe she will deliver vaginally given lack of progress today. All ques answered. Patient requests to wait one hour and recheck SVE. Cont mag, BP stable, cont PCN  Plan mag PP 24hours.      Amado Bailey MD

## 2017-12-14 PROCEDURE — 74011250636 HC RX REV CODE- 250/636: Performed by: OBSTETRICS & GYNECOLOGY

## 2017-12-14 PROCEDURE — 65270000029 HC RM PRIVATE

## 2017-12-14 PROCEDURE — 74011250637 HC RX REV CODE- 250/637: Performed by: OBSTETRICS & GYNECOLOGY

## 2017-12-14 RX ADMIN — FLUOXETINE HYDROCHLORIDE 30 MG: 20 CAPSULE ORAL at 09:17

## 2017-12-14 RX ADMIN — IBUPROFEN 800 MG: 800 TABLET ORAL at 18:10

## 2017-12-14 RX ADMIN — HYDROCODONE BITARTRATE AND ACETAMINOPHEN 1 TABLET: 5; 325 TABLET ORAL at 21:46

## 2017-12-14 RX ADMIN — DOCUSATE SODIUM 100 MG: 100 CAPSULE, LIQUID FILLED ORAL at 09:17

## 2017-12-14 RX ADMIN — DOCUSATE SODIUM 100 MG: 100 CAPSULE, LIQUID FILLED ORAL at 18:10

## 2017-12-14 RX ADMIN — HYDROCODONE BITARTRATE AND ACETAMINOPHEN 1 TABLET: 5; 325 TABLET ORAL at 01:33

## 2017-12-14 RX ADMIN — IBUPROFEN 800 MG: 800 TABLET ORAL at 09:17

## 2017-12-14 RX ADMIN — ENOXAPARIN SODIUM 40 MG: 40 INJECTION SUBCUTANEOUS at 09:17

## 2017-12-14 RX ADMIN — IBUPROFEN 800 MG: 800 TABLET ORAL at 01:33

## 2017-12-14 RX ADMIN — HYDROCODONE BITARTRATE AND ACETAMINOPHEN 1 TABLET: 5; 325 TABLET ORAL at 08:20

## 2017-12-14 RX ADMIN — ENOXAPARIN SODIUM 40 MG: 40 INJECTION SUBCUTANEOUS at 21:46

## 2017-12-14 NOTE — ROUTINE PROCESS
1110 Patient requested that I take a look at her incision as she thought that she might have seen some discharge on her pads, upon assessment the incision is intact and well approximated, it does appear that there is some old drainage around middle of incision, no redness or erythema noted.

## 2017-12-14 NOTE — PROGRESS NOTES
1910: Verbal bedside SBAR report received from Vivian Davenport Rn.  Per Anay Sanchez, Dr. Patel Precise requests that pt remain on L&D overnight. 2040: Spoke with Dr. Nerissa Snellen regarding Dr. Nathalia Hood request for pt to remain on Labor and Delivery. BPs and pt status reviewed with Dr. Nerissa Snellen. Per Dr. Nerissa Snellen, pt may be transferred to MIU once she is able to void. Dr. Nerissa Snellen confirms Lovenox order as appropriate for pt and can be administered at this time. 2130: Pt ambulates to bathroom with no assistance for attempt to void. 2145: Pt unable to void at this time, vivian care performed by patient, pt requests to try again later. 2245: RN at bedside. Pt denies need to void at this time. POC discussed with pt for pt to breastfeed, drink full water pitcher than attempt to void. 2345: Pt ambulates to bathroom to attempt to void    0130: Dr. Nerissa Snellen updated that pt was able to void. MD approves of pt being transferred to MIU at this time. 0150: TRANSFER - OUT REPORT:    Telephone report given to JENNIFER Moncada(name) on Heaven Barrett  being transferred to MIU(unit) for routine progression of care       Report consisted of patients Situation, Background, Assessment and   Recommendations(SBAR). Information from the following report(s) SBAR, Kardex, Intake/Output, MAR and Recent Results was reviewed with the receiving nurse. Lines:   Peripheral IV 12/11/17 Right Hand (Active)   Site Assessment Clean, dry, & intact 12/13/2017  8:03 PM   Phlebitis Assessment 0 12/13/2017  8:03 PM   Infiltration Assessment 0 12/13/2017  8:03 PM   Dressing Status Clean, dry, & intact 12/13/2017  8:03 PM   Dressing Type Tape;Transparent 12/13/2017  8:03 PM   Hub Color/Line Status Pink;Capped 12/13/2017  8:03 PM   Action Taken Blood drawn 12/11/2017  5:27 PM   Alcohol Cap Used Yes 12/13/2017  8:03 PM        Opportunity for questions and clarification was provided.       Patient transported with:   Registered Nurse Yovani

## 2017-12-14 NOTE — LACTATION NOTE
Problem: Lactation Care Plan  Goal: *Infant latching appropriately  Outcome: Progressing Towards Goal  Mom comfotable and relaxed with breastfeeding  Encouraged to check to see if baby is latched to lips flanged and cover nipple and arerola.       Pt will successfully establish breastfeeding by feeding in response to early feeding cues   or wake every 3h, will obtain deep latch, and will keep log of feedings/output. Taught to BF at hunger cues and or q 2-3 hrs and to offer 10-20 drops of hand expressed colostrum at any non-feeds.       Breast Assessment  Left Breast: Medium  Left Nipple: Everted, Intact, Bruised  Right Breast: Medium  Right Nipple: Everted, Intact, Bruised (slight nbruise noted on top of niipple right quandrant. Small bruise on nipple tip)  Breast- Feeding Assessment  Attends Breast-Feeding Classes: No (Family's feeding goals discussed, BF basics, how milk is made + normal  BF behaviors shared)  Breast-Feeding Experience: No  Breast Trauma/Surgery: No  Type/Quality: Good  Lactation Consultant Visits  Breast-Feedings: Good   Mother/Infant Observation  Mother Observation: Breast comfortable, Alignment, Close hold, Cramps, Holds breast, Lets baby end feeding  Infant Observation: Audible swallows, Breast tissue moves, Lips flanged, lower, Latches nipple and aereolae, Lips flanged, upper, Opens mouth  LATCH Documentation  Latch: Grasps breast, tongue down, lips flanged, rhythmic sucking  Audible Swallowing: Spontaneous and intermittent (24 hours old)  Type of Nipple: Everted (after stimulation)  Comfort (Breast/Nipple): Filling, red/small blisters/bruises, mild/mod discomfort (slight tnederness)  Hold (Positioning): Full assist, teach one side, mother does other, staff holds  LATCH Score: 8        Goal: *Weight loss less than 10% of birth weight  Outcome: Progressing Towards Goal  Encouraged mom to attempt feeding with baby led feeding cues. Just as sucking on fingers, rooting, mouthing. Looking for 8-12 feedings in 24 hours. Don't limit baby at breast, allow baby to come of breast on it's own. Baby may want to feed  often and may increase number of feedings on second day of life. Skin to skin encouraged.       If baby doesn't nurse,  Mom should  Pump and give infant any expressed milk. If not pumping any milk, mom should contact pediatrician for possible need for supplementation.      Discussed eating a healthy diet. Instructed mother to eat a variety of foods in order to get a well balanced diet. She should consume an extra 300-500 calories per day (more than her non-pregnant requirement.) These extra calories will help provide energy needed for optimal breast milk production. Mother also encouraged to \"drink to thirst\" and it is recommended that she drink fluids such as water and fruit/vegetable juice. Nutritious snacks should be available so that she can eat throughout the day to help satisfy her hunger and maintain a good milk supply. Continue taking your prenatal vitamins as long as you breast feed.         Problem: Patient Education: Go to Patient Education Activity  Goal: Patient/Family Education  Outcome: Progressing Towards Goal  Reviewed breastfeeding basics:  Supply and demand,  stomach size, early  Feeding cues, skin to skin, positioning and baby led latch-on,  latched with signs of good, deep latch vs shallow, feeding frequency and duration, and log sheet for tracking infant feedings and output. Breastfeeding Booklet and Warm line information given.   Discussed typical  weight loss and the importance of infant weight checks with pediatrician 1-2 post discharge.

## 2017-12-14 NOTE — ROUTINE PROCESS
Bedside and Verbal shift change report given to Clark Ulloa RN (oncoming nurse) by Sherren Fujisawa, RN (offgoing nurse). Report included the following information SBAR, Kardex, Intake/Output and MAR.

## 2017-12-14 NOTE — PROGRESS NOTES
PostPartum Note    Gaston Herrera  [id number]  1995  20 y.o.    S:  Ms. Gaston Herrera is a 20 y.o.  POD #2 s/p 1LTCS @ 39w6d. Doing well. She had a baby boy. Her lochia is like a period. She describes her pain as mild and is well controlled with PO medications. She is bottle feeding and this is going well. She is ambulating and voiding. Tolerating PO intake. O:   Visit Vitals    /76 (BP 1 Location: Left arm, BP Patient Position: At rest)    Pulse 92    Temp 97.9 °F (36.6 °C)    Resp 16    Ht 5' 2\" (1.575 m)    Wt 139.7 kg (308 lb)    SpO2 95%    Breastfeeding Unknown    BMI 56.33 kg/m2       Lab Results   Component Value Date/Time    WBC 13.4 2017 05:50 AM    HGB 9.3 2017 05:50 AM    HCT 28.2 2017 05:50 AM    PLATELET 004 5668 05:50 AM    MCV 83.9 2017 05:50 AM    Hgb, External 12.4 2017    Hct, External 37.9 2017       Gen - No acute distress  Abdomen - Fundus firm, below the umbilicus   Ext - Warm, well perfused. Nontender    A/P:  POD #2 s/p 1LTCS @ 39w6d for severe preE- sp mag, doing well. 1.  Routine PP instructions/ care discussed  -blood pressures normotensive to mild range; continue to monitor; sp 24hrs mag  2. Blood type - Rh pos  3. Rubella imm  4. Circumcision desired although infant w sacral abnormality; pending peds approval   5. Discharge home POD3   6. F/U 4-6 weeks for PP check.       Tiara Wilkerson MD  [facility]

## 2017-12-14 NOTE — ROUTINE PROCESS
Bedside and Verbal shift change report given to JUAREZ Orellana RN (oncoming nurse) by BROOKLYN Contreras RN (offgoing nurse). Report included the following information SBAR, Kardex, Procedure Summary, Intake/Output, MAR and Recent Results.

## 2017-12-15 VITALS
TEMPERATURE: 98.7 F | SYSTOLIC BLOOD PRESSURE: 136 MMHG | WEIGHT: 293 LBS | HEART RATE: 104 BPM | HEIGHT: 62 IN | BODY MASS INDEX: 53.92 KG/M2 | DIASTOLIC BLOOD PRESSURE: 78 MMHG | RESPIRATION RATE: 16 BRPM | OXYGEN SATURATION: 94 %

## 2017-12-15 PROCEDURE — 74011250636 HC RX REV CODE- 250/636: Performed by: OBSTETRICS & GYNECOLOGY

## 2017-12-15 PROCEDURE — 74011250637 HC RX REV CODE- 250/637: Performed by: OBSTETRICS & GYNECOLOGY

## 2017-12-15 RX ORDER — HYDROCODONE BITARTRATE AND ACETAMINOPHEN 5; 325 MG/1; MG/1
1 TABLET ORAL
Qty: 30 TAB | Refills: 0 | Status: SHIPPED | OUTPATIENT
Start: 2017-12-15 | End: 2017-12-20

## 2017-12-15 RX ORDER — IBUPROFEN 800 MG/1
800 TABLET ORAL EVERY 8 HOURS
Qty: 40 TAB | Refills: 0 | Status: SHIPPED | OUTPATIENT
Start: 2017-12-15 | End: 2018-10-01 | Stop reason: ALTCHOICE

## 2017-12-15 RX ADMIN — IBUPROFEN 800 MG: 800 TABLET ORAL at 14:16

## 2017-12-15 RX ADMIN — DOCUSATE SODIUM 100 MG: 100 CAPSULE, LIQUID FILLED ORAL at 09:50

## 2017-12-15 RX ADMIN — FLUOXETINE HYDROCHLORIDE 30 MG: 20 CAPSULE ORAL at 09:50

## 2017-12-15 RX ADMIN — ENOXAPARIN SODIUM 40 MG: 40 INJECTION SUBCUTANEOUS at 09:49

## 2017-12-15 RX ADMIN — HYDROCODONE BITARTRATE AND ACETAMINOPHEN 1 TABLET: 5; 325 TABLET ORAL at 06:40

## 2017-12-15 RX ADMIN — IBUPROFEN 800 MG: 800 TABLET ORAL at 05:40

## 2017-12-15 NOTE — PROGRESS NOTES
Post-Operative  Day 2.5    Connor Sarmiento         Information for the patient's :  Vidhi Lipscomb, Male [[id number]]   , Low Transverse     Patient doing well without unusual complaints. Restarted Prozac. Infant being evaluated. Ambulates, tolerates po and voids easily. Vitals:  Visit Vitals    /88 (BP 1 Location: Left arm, BP Patient Position: Sitting)    Pulse (!) 103    Temp 98.5 °F (36.9 °C)    Resp 16    Ht 5' 2\" (1.575 m)    Wt 139.7 kg (308 lb)    SpO2 94%    Breastfeeding Unknown    BMI 56.33 kg/m2     Temp (24hrs), Av.2 °F (36.8 °C), Min:97.9 °F (36.6 °C), Max:98.5 °F (36.9 °C)      Last 24hr Input/Output:  No intake or output data in the 24 hours ending 12/15/17 0831       Exam:       Patient without distress. Abdomen:soft, expected no tenderness, fundus firm @U-2  Inc: c/d/i; Staples  Lower extremities are mild tenderness with 1+ edema. Labs:   Lab Results   Component Value Date/Time    WBC 13.4 2017 05:50 AM    WBC 15.7 2017 03:43 PM    WBC 9.9 2017 05:26 PM    HGB 9.3 2017 05:50 AM    HGB 10.4 2017 03:43 PM    HGB 10.9 2017 05:26 PM    HCT 28.2 2017 05:50 AM    HCT 31.2 2017 03:43 PM    HCT 33.0 2017 05:26 PM    PLATELET 159  05:50 AM    PLATELET 585 22/15/8894 03:43 PM    PLATELET 397  05:26 PM    Hgb, External 12.4 2017    Hct, External 37.9 2017       No results found for this or any previous visit (from the past 24 hour(s)). Assessment: Post-Op day 2.5, stable; O+/RI    Plan:   1. Routine post-operative care   2. Infant evaluation continues for weight loss and hyperbilirubinemia. 3. Continue Prozac for patient. 4. Will plan D/C home if infant is cleared for discharge, otherwise discharge 17. 5. Lovenox prophylaxis being given in hospital secondary to maternal body habitus.

## 2017-12-15 NOTE — ROUTINE PROCESS
Discharge teaching given to patient including but not limited to signs and symptoms and who to call; restrictions and limitations; postpartum depression. All questions answered. Prescriptions given to patient with instructions.

## 2017-12-15 NOTE — ROUTINE PROCESS
Bedside and Verbal shift change report given to Charlotte Ventura RN (oncoming nurse) by Eze Martinez RNC (offgoing nurse). Report included the following information SBAR, Kardex, Intake/Output, MAR and Recent Results.

## 2017-12-15 NOTE — LACTATION NOTE
Problem: Lactation Care Plan  Goal: *Infant latching appropriately  Outcome: Resolved/Met Date Met: 12/15/17  Baby loss over 11 % . Ped recommended suppllment  with formula. FOB giving bottle now. Mom states baby nursed prior to supplement.       Discussed pumping to increase supply. MOm declined pumping now but has pump at home and will pump after feeding baby.        Goal: *Weight loss less than 10% of birth weight  Outcome: Resolved/Met Date Met: 12/15/17  Encouraged mom to attempt feeding with baby led feeding cues. Just as sucking on fingers, rooting, mouthing. Looking for 8-12 feedings in 24 hours. Don't limit baby at breast, allow baby to come of breast on it's own. Baby may want to feed  often and may increase number of feedings on second day of life. Skin to skin encouraged.       If baby doesn't nurse,  Mom should  Pump and give infant any expressed milk. If not pumping any milk, mom should contact pediatrician for possible need for supplementation.            Problem: Patient Education: Go to Patient Education Activity  Goal: Patient/Family Education  Outcome: Resolved/Met Date Met: 12/15/17  Discussed eating a healthy diet. Instructed mother to eat a variety of foods in order to get a well balanced diet. She should consume an extra 300-500 calories per day (more than her non-pregnant requirement.) These extra calories will help provide energy needed for optimal breast milk production. Mother also encouraged to \"drink to thirst\" and it is recommended that she drink fluids such as water and fruit/vegetable juice. Nutritious snacks should be available so that she can eat throughout the day to help satisfy her hunger and maintain a good milk supply. Continue taking your prenatal vitamins as long as you breast feed.      Discussed eating a healthy diet. Instructed mother to eat a variety of foods in order to get a well balanced diet.  She should consume an extra 300-500 calories per day (more than her non-pregnant requirement.) These extra calories will help provide energy needed for optimal breast milk production. Mother also encouraged to \"drink to thirst\" and it is recommended that she drink fluids such as water and fruit/vegetable juice. Nutritious snacks should be available so that she can eat throughout the day to help satisfy her hunger and maintain a good milk supply. Continue taking your prenatal vitamins as long as you breast feed.      Guidelines for pumping, milk collection and storage, proper cleaning of pump parts all reviewed. Differences between hospital grade rental pumps vs store bought double electric/hand pumps discussed. List of area pump rental locations and lactation support services reviewed.   Mom states has pump at home.

## 2017-12-15 NOTE — ROUTINE PROCESS
Bedside and Verbal shift change report given to CLARISA Valdes RN (oncoming nurse) by [employer]. Cadence Burr RN (offgoing nurse). Report included the following information SBAR, Kardex, Intake/Output, MAR, Accordion and Recent Results.

## 2017-12-15 NOTE — DISCHARGE INSTRUCTIONS
Discharge Instructions for  Section    Patient ID:  Heather Mullins  [id number]  20 y.o.  1995    Take Home Medications   See Dr Annalise Dougherty in one week for staple removal and blood pressure check    Continue taking your prenatal vitamins if you are breastfeeding. Follow-up care is a key part of your treatment and safety. Be sure to keep all your scheduled appointments. Activity  1. Avoid heavy lifting greater than 10lbs for 2 weeks after your surgery  2. Pelvic rest for 6 weeks, ie, nothing in your vagina for 6 weeks  (no intercourse, tampons, or douching). No tubs for 6 weeks. 3. No driving for 2 weeks and until you are no longer taking prescription pain medications and you can put your foot on the break in a hurry   4. Limit climbing stairs to only when necessary the first 1-2 weeks. Hold the railing and do not carry your baby up and downstairs at first for safety   5. You may walk as tolerated, though be care to not over-do it. Walking will assist in overall healing, decrease constipation and bloating. Stefan Joes feel better more quickly with some daily movement. Diet  Regular diet as tolerated    Wound care  There are several types of incision closures. 1. If you have metal staples in place when you leave the hospital, please call your doctors office to schedule the staple removal as directed at discharge. 2. If you have steri strips covering your incision, you may remove them as they fall off or be sure to remove them about one week after your surgery. Removing them in the shower may be easier. 3. If you have Dermabond, or skin glue, covering your incision, it will fall off slowly in the next few weeks. You may remove it as it begins to peel off. Additional wound care  1. Clear or reddish drainage from your incision is normal.  It's best to leave it open to the air, but if there is drainage, you may cover the incision lightly with gauze, preferably without tape.    2.  Keep the incision clean and dry. 3. Numbness of the skin at or around your incision is normal and the feeling usually returns gradually. 4. Call your doctor if you have increasing drainage from your incision, an unpleasant odor, red streaks, an increase in pain, or if it appears to open. Pain Management  1. Continue prescription pain medication as written by discharging physician  2. Over the counter medications such as Tylenol and ibuprofen (Motrin or Advil) are also ideal.  These may be taken together or in alternating doses. You may  take the maximum dose:  Motrin or Advil (generic ibuprofen), either 3 tablets every 6 hours or 4 tablets every 8 hours. Your prescription medication conntains a narcotic mixed with Tylenol,so  you should not take any extra Tylenol or acetaminophen until you have reduced your prescription pain medication. The maximum dose is Tylenol or acetominophen 1000 mg every 6 hours (equivalent to 2 Extra Strength Tylenols every 6 hours). 3. After a few days, begin to replace the prescription medication with over the counter medications. Use the prescription medication if needed for more severe pain or at night. The prescription medication can be addictive if overused. 4. Add heating pad or sitz baths as needed. Constipation  1. Constipation is normal after surgery, especially while taking prescription narcotic pain medication. 2. Over the counter remedies including ducosate (Colace), take 1-2 capsules 1-2 times daily for soft stool as needed. You may also add/ try milk of magnesia or rectal remedies such as Dulcolax or Fleets enema. Recovery: What to Expect at Home  1. Fatigue is expected. Try to rest when you can and don't worry about doing housework or other tasks which can wait. 2. The soreness in your incision will improve significantly over the first 2 weeks, but it may take 6-8 weeks before you are completely recovered.   3. Back pain or general body aches or muscle soreness are expected and should improve with acetominophen or ibuprofen. 4. Leg swelling due to pregnancy and/or IV fluids given in the hospital will take about two weeks to resolve. 5. Most women experience some form of the \"Baby Blues\" after having a baby. Feeling emotional, tearful, frustrated, anxious, sad, and irritable some of the time is normal and go away after about 2 weeks. Adequate rest and help from your family will help. Take breaks from caring for the baby. Call your doctor if your symptoms seem severe, last more than 2 weeks, or seem to be getting worse instead of better. Get help immediately if you have thoughts of wanting to hurt yourself or others! Call your doctor or seek immediate medical care if you have:  Heavy vaginal bleeding, soaking through one or more pads an hour for several hours. Foul-smelling discharge from your vagina or incision. Consistent nausea and vomiting and cannot keep fluids down. Consistent pain that does not get better after you take pain medicine.   Sudden chest pain and shortness of breath  Signs of a blood clot: pain/ swelling/ increasing redness in your lower extremeties  Signs of infection: increased pain in your abdomen or vaginal area; red streaks, warmth, or tenderness of your breasts; fever of 100.5 F or greater

## 2017-12-15 NOTE — PROGRESS NOTES
Patient discharged to home with infant and significant other. Infant in carseat. Patient in wheelchair. Discharge bag, including diaper bag and supplies, and breastpump kit given. Prescriptions given to patient by nurse CLARISA Rosario rn. Discharge instructions reviewed patient and significant other, signed by patient, and copies given. Per patient and significant other, no questions. Patient and infant bands verified, see infant note and/or footprint sheet on chart.

## 2017-12-19 ENCOUNTER — HOSPITAL ENCOUNTER (OUTPATIENT)
Age: 22
Setting detail: OBSERVATION
Discharge: HOME | End: 2017-12-20
Attending: STUDENT IN AN ORGANIZED HEALTH CARE EDUCATION/TRAINING PROGRAM | Admitting: OBSTETRICS & GYNECOLOGY
Payer: MEDICAID

## 2017-12-19 PROBLEM — O14.90 PRE-ECLAMPSIA: Status: ACTIVE | Noted: 2017-12-19

## 2017-12-19 LAB
ALBUMIN SERPL-MCNC: 1.8 G/DL (ref 3.5–5)
ALBUMIN/GLOB SERPL: 0.5 {RATIO} (ref 1.1–2.2)
ALP SERPL-CCNC: 101 U/L (ref 45–117)
ALT SERPL-CCNC: 21 U/L (ref 12–78)
ANION GAP SERPL CALC-SCNC: 12 MMOL/L (ref 5–15)
AST SERPL-CCNC: 17 U/L (ref 15–37)
BILIRUB SERPL-MCNC: 0.2 MG/DL (ref 0.2–1)
BUN SERPL-MCNC: 7 MG/DL (ref 6–20)
BUN/CREAT SERPL: 11 (ref 12–20)
CALCIUM SERPL-MCNC: 8.1 MG/DL (ref 8.5–10.1)
CHLORIDE SERPL-SCNC: 107 MMOL/L (ref 97–108)
CO2 SERPL-SCNC: 24 MMOL/L (ref 21–32)
CREAT SERPL-MCNC: 0.64 MG/DL (ref 0.55–1.02)
CREAT UR-MCNC: 15.59 MG/DL
ERYTHROCYTE [DISTWIDTH] IN BLOOD BY AUTOMATED COUNT: 13.9 % (ref 11.5–14.5)
GLOBULIN SER CALC-MCNC: 3.6 G/DL (ref 2–4)
GLUCOSE SERPL-MCNC: 80 MG/DL (ref 65–100)
HCT VFR BLD AUTO: 28.6 % (ref 35–47)
HGB BLD-MCNC: 9.2 G/DL (ref 11.5–16)
LDH SERPL L TO P-CCNC: 283 U/L (ref 81–246)
Lab: NORMAL
MCH RBC QN AUTO: 27.3 PG (ref 26–34)
MCHC RBC AUTO-ENTMCNC: 32.2 G/DL (ref 30–36.5)
MCV RBC AUTO: 84.9 FL (ref 80–99)
PLATELET # BLD AUTO: 370 K/UL (ref 150–400)
POTASSIUM SERPL-SCNC: 4.1 MMOL/L (ref 3.5–5.1)
PROT SERPL-MCNC: 5.4 G/DL (ref 6.4–8.2)
PROT UR-MCNC: <6 MG/DL (ref 0–11.9)
PROT/CREAT UR-RTO: NORMAL
RBC # BLD AUTO: 3.37 M/UL (ref 3.8–5.2)
REFERENCE LAB,REFLB: NORMAL
SODIUM SERPL-SCNC: 143 MMOL/L (ref 136–145)
TEST DESCRIPTION:,ATST: NORMAL
URATE SERPL-MCNC: 7.4 MG/DL (ref 2.6–6)
WBC # BLD AUTO: 9.8 K/UL (ref 3.6–11)

## 2017-12-19 PROCEDURE — 80053 COMPREHEN METABOLIC PANEL: CPT | Performed by: OBSTETRICS & GYNECOLOGY

## 2017-12-19 PROCEDURE — 99218 HC RM OBSERVATION: CPT

## 2017-12-19 PROCEDURE — 84550 ASSAY OF BLOOD/URIC ACID: CPT | Performed by: OBSTETRICS & GYNECOLOGY

## 2017-12-19 PROCEDURE — 85027 COMPLETE CBC AUTOMATED: CPT | Performed by: OBSTETRICS & GYNECOLOGY

## 2017-12-19 PROCEDURE — 99285 EMERGENCY DEPT VISIT HI MDM: CPT

## 2017-12-19 PROCEDURE — 74011250637 HC RX REV CODE- 250/637: Performed by: OBSTETRICS & GYNECOLOGY

## 2017-12-19 PROCEDURE — 75810000275 HC EMERGENCY DEPT VISIT NO LEVEL OF CARE

## 2017-12-19 PROCEDURE — 84156 ASSAY OF PROTEIN URINE: CPT | Performed by: OBSTETRICS & GYNECOLOGY

## 2017-12-19 PROCEDURE — 77030032490 HC SLV COMPR SCD KNE COVD -B

## 2017-12-19 PROCEDURE — 83615 LACTATE (LD) (LDH) ENZYME: CPT | Performed by: OBSTETRICS & GYNECOLOGY

## 2017-12-19 PROCEDURE — 36415 COLL VENOUS BLD VENIPUNCTURE: CPT | Performed by: OBSTETRICS & GYNECOLOGY

## 2017-12-19 RX ORDER — NIFEDIPINE 30 MG/1
30 TABLET, EXTENDED RELEASE ORAL EVERY 12 HOURS
Status: DISCONTINUED | OUTPATIENT
Start: 2017-12-20 | End: 2017-12-20

## 2017-12-19 RX ORDER — DIPHENHYDRAMINE HCL 25 MG
25 CAPSULE ORAL
Status: DISCONTINUED | OUTPATIENT
Start: 2017-12-19 | End: 2017-12-20 | Stop reason: HOSPADM

## 2017-12-19 RX ORDER — DOCUSATE SODIUM 100 MG/1
100 CAPSULE, LIQUID FILLED ORAL 2 TIMES DAILY
Status: DISCONTINUED | OUTPATIENT
Start: 2017-12-19 | End: 2017-12-20 | Stop reason: HOSPADM

## 2017-12-19 RX ORDER — IBUPROFEN 800 MG/1
800 TABLET ORAL EVERY 8 HOURS
Status: DISCONTINUED | OUTPATIENT
Start: 2017-12-19 | End: 2017-12-20 | Stop reason: HOSPADM

## 2017-12-19 RX ORDER — NIFEDIPINE 10 MG/1
30 CAPSULE ORAL ONCE
Status: COMPLETED | OUTPATIENT
Start: 2017-12-19 | End: 2017-12-19

## 2017-12-19 RX ORDER — ACETAMINOPHEN 325 MG/1
650 TABLET ORAL
Status: DISCONTINUED | OUTPATIENT
Start: 2017-12-19 | End: 2017-12-20 | Stop reason: HOSPADM

## 2017-12-19 RX ORDER — HYDROCODONE BITARTRATE AND ACETAMINOPHEN 5; 325 MG/1; MG/1
1 TABLET ORAL
Status: DISCONTINUED | OUTPATIENT
Start: 2017-12-19 | End: 2017-12-20 | Stop reason: HOSPADM

## 2017-12-19 RX ORDER — NIFEDIPINE 10 MG/1
30 CAPSULE ORAL EVERY 12 HOURS
Status: DISCONTINUED | OUTPATIENT
Start: 2017-12-19 | End: 2017-12-19

## 2017-12-19 RX ORDER — SIMETHICONE 80 MG
80 TABLET,CHEWABLE ORAL AS NEEDED
Status: DISCONTINUED | OUTPATIENT
Start: 2017-12-19 | End: 2017-12-20 | Stop reason: HOSPADM

## 2017-12-19 RX ORDER — ZOLPIDEM TARTRATE 5 MG/1
5 TABLET ORAL
Status: DISCONTINUED | OUTPATIENT
Start: 2017-12-20 | End: 2017-12-20 | Stop reason: HOSPADM

## 2017-12-19 RX ORDER — NIFEDIPINE 30 MG/1
30 TABLET, EXTENDED RELEASE ORAL DAILY
COMMUNITY
End: 2018-10-01 | Stop reason: ALTCHOICE

## 2017-12-19 RX ORDER — NALOXONE HYDROCHLORIDE 0.4 MG/ML
0.4 INJECTION, SOLUTION INTRAMUSCULAR; INTRAVENOUS; SUBCUTANEOUS AS NEEDED
Status: DISCONTINUED | OUTPATIENT
Start: 2017-12-19 | End: 2017-12-20 | Stop reason: HOSPADM

## 2017-12-19 RX ORDER — NIFEDIPINE 30 MG/1
30 TABLET, EXTENDED RELEASE ORAL DAILY
Status: DISCONTINUED | OUTPATIENT
Start: 2017-12-19 | End: 2017-12-19

## 2017-12-19 RX ORDER — ACETAMINOPHEN 325 MG/1
650 TABLET ORAL
COMMUNITY
End: 2018-10-01 | Stop reason: ALTCHOICE

## 2017-12-19 RX ADMIN — NIFEDIPINE 30 MG: 10 CAPSULE ORAL at 07:31

## 2017-12-19 RX ADMIN — NIFEDIPINE 30 MG: 30 TABLET, FILM COATED, EXTENDED RELEASE ORAL at 18:54

## 2017-12-19 RX ADMIN — ACETAMINOPHEN 650 MG: 325 TABLET ORAL at 10:47

## 2017-12-19 RX ADMIN — FLUOXETINE 30 MG: 10 CAPSULE ORAL at 05:12

## 2017-12-19 NOTE — H&P
Obstetrics Admission H&P    Brayden Norton  [id number]  1995    Chief Complaint:   elevated bP at home and anxiety    HPI: 20 y.o. female  . Pregnancy has been complicated by preeclampsia. Patient complains of feeling stressed at home. for 1 days. Patient denies fever, headache  and nausea and vomiting. She was delivered by cs approximately a week ago. Was seen in the office yesterday and placed on antihypertensive meds. Took one dose at home. Did not take her Prozac for her anxiety. Came in for BP eval. Had magnesium at her last admission. ROS:  A comprehensive review of systems was negative except for that written in the HPI. OB History      Para Term  AB Living    1 1 1   1    SAB TAB Ectopic Molar Multiple Live Births        0 1        Past Medical History:   Diagnosis Date    Pre-eclampsia 2017    Psychiatric problem     depression and OCD     Past Surgical History:   Procedure Laterality Date     DELIVERY ONLY       Social History     Social History    Marital status: SINGLE     Spouse name: N/A    Number of children: N/A    Years of education: N/A     Occupational History    Not on file. Social History Main Topics    Smoking status: Former Smoker     Packs/day: 0.25     Years: 6.00     Quit date: 2017    Smokeless tobacco: Not on file      Comment: quit at delivery of child    Alcohol use No    Drug use: No    Sexual activity: Yes     Partners: Male     Birth control/ protection: None     Other Topics Concern    Not on file     Social History Narrative     History reviewed. No pertinent family history. No Known Allergies  Prior to Admission Medications   Prescriptions Last Dose Informant Patient Reported? Taking? FLUoxetine (PROZAC) 10 mg capsule 2017 at Unknown time  Yes Yes   Sig: Take 30 mg by mouth daily.  Indications: Obsessive-Compulsive Disorder, depression   HYDROcodone-acetaminophen (NORCO) 5-325 mg per tablet Not Taking at Unknown time  No No   Sig: Take 1 Tab by mouth every four (4) hours as needed. Max Daily Amount: 6 Tabs. NIFEdipine ER (PROCARDIA XL) 30 mg ER tablet 2017 at 1910  Yes Yes   Sig: Take 30 mg by mouth daily. acetaminophen (TYLENOL) 325 mg tablet 2017 at Unknown time  Yes Yes   Sig: Take 650 mg by mouth every four (4) hours as needed for Pain. ibuprofen (MOTRIN) 800 mg tablet Not Taking at Unknown time  No No   Sig: Take 1 Tab by mouth every eight (8) hours. Facility-Administered Medications: None         Vitals:  Patient Vitals for the past 8 hrs:   BP Temp Pulse Resp Height Weight   17 0753 108/50 - (!) 139 - - -   17 0720 163/84 - 75 - - -   17 0502 - - - - 5' 2\" (1.575 m) 130.4 kg (287 lb 6.4 oz)   17 0450 (!) 157/92 - 65 18 - -   17 0440 (!) 155/94 - (!) 58 - - -   17 0430 (!) 153/94 - 76 15 - -   17 0420 (!) 151/91 - 76 - - -   17 0412 149/88 - 78 - - -   17 0400 164/90 - 68 16 - -   17 0350 158/82 - 64 - - -   17 0341 182/85 - 100 - - -   17 0331 (!) 185/99 97.9 °F (36.6 °C) 90 16 - -   17 0309 (!) 188/124 - (!) 115 - - -     Temp (24hrs), Av.9 °F (36.6 °C), Min:97.9 °F (36.6 °C), Max:97.9 °F (36.6 °C)    I&O:                1901 -  0700  In: -   Out: 450 [Urine:450]    Exam:  Patient without distress.                Abdomen soft, non-tender, incision intact               Fundus soft and non tender               Perineum No sign of blood or amniotic fluid               Lower extremities edema 1+               Patellar Reflexes: 1+ bilaterally               Clonus: absent               Labs:   Recent Results (from the past 24 hour(s))   CBC W/O DIFF    Collection Time: 17  4:56 AM   Result Value Ref Range    WBC 9.8 3.6 - 11.0 K/uL    RBC 3.37 (L) 3.80 - 5.20 M/uL    HGB 9.2 (L) 11.5 - 16.0 g/dL    HCT 28.6 (L) 35.0 - 47.0 %    MCV 84.9 80.0 - 99.0 FL    MCH 27.3 26.0 - 34.0 PG    MCHC 32.2 30.0 - 36.5 g/dL    RDW 13.9 11.5 - 14.5 %    PLATELET 650 360 - 863 K/uL   METABOLIC PANEL, COMPREHENSIVE    Collection Time: 12/19/17  4:56 AM   Result Value Ref Range    Sodium 143 136 - 145 mmol/L    Potassium 4.1 3.5 - 5.1 mmol/L    Chloride 107 97 - 108 mmol/L    CO2 24 21 - 32 mmol/L    Anion gap 12 5 - 15 mmol/L    Glucose 80 65 - 100 mg/dL    BUN 7 6 - 20 MG/DL    Creatinine 0.64 0.55 - 1.02 MG/DL    BUN/Creatinine ratio 11 (L) 12 - 20      GFR est AA >60 >60 ml/min/1.73m2    GFR est non-AA >60 >60 ml/min/1.73m2    Calcium 8.1 (L) 8.5 - 10.1 MG/DL    Bilirubin, total 0.2 0.2 - 1.0 MG/DL    ALT (SGPT) 21 12 - 78 U/L    AST (SGOT) 17 15 - 37 U/L    Alk. phosphatase 101 45 - 117 U/L    Protein, total 5.4 (L) 6.4 - 8.2 g/dL    Albumin 1.8 (L) 3.5 - 5.0 g/dL    Globulin 3.6 2.0 - 4.0 g/dL    A-G Ratio 0.5 (L) 1.1 - 2.2     LD    Collection Time: 12/19/17  4:56 AM   Result Value Ref Range     (H) 81 - 246 U/L   URIC ACID    Collection Time: 12/19/17  4:56 AM   Result Value Ref Range    Uric acid 7.4 (H) 2.6 - 6.0 MG/DL   PROTEIN/CREATININE RATIO, URINE    Collection Time: 12/19/17  4:58 AM   Result Value Ref Range    Protein, urine random <6 0.0 - 11.9 mg/dL    Creatinine, urine 15.59 mg/dL    Protein/Creat. urine Ratio Cannot be calculated         Assessment and Plan:      1. Post partum HTN. Continue with meds. Added a dose of Nifedepine this am and took her Prozac. Anticipate discharge after stabilising bPs.

## 2017-12-19 NOTE — IP AVS SNAPSHOT
[address] 
[phone] Patient: Audrey Dean MRN: [id number] :1995 My Medications STOP taking these medications HYDROcodone-acetaminophen 5-325 mg per tablet Commonly known as:  [address] these medications as instructed Instructions Each Dose to Equal  
 Morning Noon Evening Bedtime  
 ibuprofen 800 mg tablet Commonly known as:  MOTRIN Take 1 Tab by mouth every eight (8) hours. 800 mg  
    
   
   
   
  
 labetalol 200 mg tablet Commonly known as:  Jak Horn Take 1 Tab by mouth every eight (8) hours. 200 mg  
    
   
   
   
  
 * PROCARDIA XL 30 mg ER tablet Generic drug:  NIFEdipine ER Take 30 mg by mouth daily. 30 mg  
    
   
   
   
  
 * NIFEdipine ER 60 mg ER tablet Commonly known as:  PROCARDIA XL Take 1 Tab by mouth daily. 60 mg PROzac 10 mg capsule Generic drug:  FLUoxetine Take 30 mg by mouth daily. Indications: Obsessive-Compulsive Disorder, depression 30 mg  
    
   
   
   
  
 TYLENOL 325 mg tablet Generic drug:  acetaminophen Take 650 mg by mouth every four (4) hours as needed for Pain. 650 mg  
    
   
   
   
  
 * Notice: This list has 2 medication(s) that are the same as other medications prescribed for you. Read the directions carefully, and ask your doctor or other care provider to review them with you. Where to Get Your Medications Information on where to get these meds will be given to you by the nurse or doctor. ! Ask your nurse or doctor about these medications  
  labetalol 200 mg tablet NIFEdipine ER 60 mg ER tablet

## 2017-12-19 NOTE — PROGRESS NOTES
12/19/17 3:42 PM  CM met with patient today, as she is 1 week postpartum returning for increased BPs. Obs status and letter provided, no questions or concerns. CM met with patient during her hospitalization for her delivery. CM follow up with patient today. She confirmed that she is well supported at home with her boyfriend Andrea Patel, her mom, and his family. His aunt is babysitting the baby while she is hospitalization. Confirmed that she has transportation to and from appointments. Pharmacy choice is [facility] in [place]. Has rx coverage. Home is accessible and family will provide transportation home at discharge. Care Management Interventions  PCP Verified by CM:  Yes (Dr. Cole Shahid)  Mode of Transport at Discharge: Self  Transition of Care Consult (CM Consult): Discharge Planning  Current Support Network: Relative's Home  Confirm Follow Up Transport: Family  Plan discussed with Pt/Family/Caregiver: Yes  Discharge Location  Discharge Placement: Home with outpatient services  [facility], [place]

## 2017-12-19 NOTE — DISCHARGE INSTRUCTIONS
Depression After Childbirth: Care Instructions  Your Care Instructions    Many women get the \"baby blues\" during the first few days after childbirth. You may lose sleep, feel irritable, and cry easily. You may feel happy one minute and sad the next. Hormone changes are one cause of these emotional changes. Also, the demands of a new baby, along with visits from relatives or other family needs, add to a mother's stress. The \"baby blues\" often peak around the fourth day. Then they ease up in less than 2 weeks. If your moodiness or anxiety lasts for more than 2 weeks, or if you feel like life is not worth living, you may have postpartum depression. This is different for each mother. Some mothers with serious depression may worry intensely about their infant's well-being. Others may feel distant from their child. Some mothers might even feel that they might harm their baby. A mother may have signs of paranoia, wondering if someone is watching her. Depression is not a sign of weakness. It is a medical condition that requires treatment. Medicine and counseling often work well to reduce depression. Talk to your doctor about taking antidepressant medicine while breastfeeding. Follow-up care is a key part of your treatment and safety. Be sure to make and go to all appointments, and call your doctor if you are having problems. It's also a good idea to know your test results and keep a list of the medicines you take. How do you know if you are depressed? With all the changes in your life, you may not know if you are depressed. Pregnancy sometimes causes changes in how you feel that are similar to the symptoms of depression. Symptoms of depression include:  · Feeling sad or hopeless and losing interest in daily activities. These are the most common symptoms of depression. · Sleeping too much or not enough. · Feeling tired. You may feel as if you have no energy. · Eating too much or too little.   · Writing or talking about death, such as writing suicide notes or talking about guns, knives, or pills. Keep the numbers for these national suicide hotlines: 8-662-084-TALK (4-714.278.8187) and 6-196-RAHHMVO (9-954.190.4682). If you or someone you know talks about suicide or feeling hopeless, get help right away. How can you care for yourself at home? · Be safe with medicines. Take your medicines exactly as prescribed. Call your doctor if you think you are having a problem with your medicine. · Eat a healthy diet so that you can keep up your energy. · Get regular daily exercise, such as walks, to help improve your mood. · Get as much sunlight as possible. Keep your shades and curtains open. Get outside as much as you can. · Avoid using alcohol or other substances to feel better. · Get as much rest and sleep as possible. Avoid doing too much. Being too tired can increase depression. · Play stimulating music throughout your day and soothing music at night. · Schedule outings and visits with friends and family. Ask them to call you regularly, so that you do not feel alone. · Ask for help with preparing food and other daily tasks. Family and friends are often happy to help a mother with a . · Be honest with yourself and those who care about you. Tell them about your struggle. · Join a support group of new mothers. No one can better understand the challenges of caring for a  than other new mothers. · If you feel like life is not worth living or are feeling hopeless, get help right away. Keep the numbers for these national suicide hotlines: -TALK (5-577.326.8216) and 2-014-DETJDAP (3-634.841.6143). When should you call for help? Call 911 anytime you think you may need emergency care. For example, call if:  ? · You feel you cannot stop from hurting yourself, your baby, or someone else.    ?Call your doctor now or seek immediate medical care if:  ? · You are having trouble caring for yourself or your baby. ? · You hear voices. ? Watch closely for changes in your health, and be sure to contact your doctor if:  ? · You have problems with your depression medicine. ? · You do not get better as expected. Where can you learn more? Go to http://curtis-eevline.info/. Enter Z007 in the search box to learn more about \"Depression After Childbirth: Care Instructions. \"  Current as of: May 12, 2017  Content Version: 11.4  © 1176-4089 [employer]. Care instructions adapted under license by [employer] (which disclaims liability or warranty for this information). If you have questions about a medical condition or this instruction, always ask your healthcare professional. [address] any warranty or liability for your use of this information. High Blood Pressure: Care Instructions  Your Care Instructions    If your blood pressure is usually above 140/90, you have high blood pressure, or hypertension. That means the top number is 140 or higher or the bottom number is 90 or higher, or both. Despite what a lot of people think, high blood pressure usually doesn't cause headaches or make you feel dizzy or lightheaded. It usually has no symptoms. But it does increase your risk for heart attack, stroke, and kidney or eye damage. The higher your blood pressure, the more your risk increases. Your doctor will give you a goal for your blood pressure. Your goal will be based on your health and your age. An example of a goal is to keep your blood pressure below 140/90. Lifestyle changes, such as eating healthy and being active, are always important to help lower blood pressure. You might also take medicine to reach your blood pressure goal.  Follow-up care is a key part of your treatment and safety. Be sure to make and go to all appointments, and call your doctor if you are having problems.  It's also a good idea to know your test results and keep a list of the medicines you take. How can you care for yourself at home? Medical treatment  · If you stop taking your medicine, your blood pressure will go back up. You may take one or more types of medicine to lower your blood pressure. Be safe with medicines. Take your medicine exactly as prescribed. Call your doctor if you think you are having a problem with your medicine. · Talk to your doctor before you start taking aspirin every day. Aspirin can help certain people lower their risk of a heart attack or stroke. But taking aspirin isn't right for everyone, because it can cause serious bleeding. · See your doctor regularly. You may need to see the doctor more often at first or until your blood pressure comes down. · If you are taking blood pressure medicine, talk to your doctor before you take decongestants or anti-inflammatory medicine, such as ibuprofen. Some of these medicines can raise blood pressure. · Learn how to check your blood pressure at home. Lifestyle changes  · Stay at a healthy weight. This is especially important if you put on weight around the waist. Losing even 10 pounds can help you lower your blood pressure. · If your doctor recommends it, get more exercise. Walking is a good choice. Bit by bit, increase the amount you walk every day. Try for at least 30 minutes on most days of the week. You also may want to swim, bike, or do other activities. · Avoid or limit alcohol. Talk to your doctor about whether you can drink any alcohol. · Try to limit how much sodium you eat to less than 2,300 milligrams (mg) a day. Your doctor may ask you to try to eat less than 1,500 mg a day. · Eat plenty of fruits (such as bananas and oranges), vegetables, legumes, whole grains, and low-fat dairy products. · Lower the amount of saturated fat in your diet. Saturated fat is found in animal products such as milk, cheese, and meat.  Limiting these foods may help you lose weight and also lower your risk for heart disease. · Do not smoke. Smoking increases your risk for heart attack and stroke. If you need help quitting, talk to your doctor about stop-smoking programs and medicines. These can increase your chances of quitting for good. When should you call for help? Call 911 anytime you think you may need emergency care. This may mean having symptoms that suggest that your blood pressure is causing a serious heart or blood vessel problem. Your blood pressure may be over 180/110. ? For example, call 911 if:  ? · You have symptoms of a heart attack. These may include:  ¨ Chest pain or pressure, or a strange feeling in the chest.  ¨ Sweating. ¨ Shortness of breath. ¨ Nausea or vomiting. ¨ Pain, pressure, or a strange feeling in the back, neck, jaw, or upper belly or in one or both shoulders or arms. ¨ Lightheadedness or sudden weakness. ¨ A fast or irregular heartbeat. ? · You have symptoms of a stroke. These may include:  ¨ Sudden numbness, tingling, weakness, or loss of movement in your face, arm, or leg, especially on only one side of your body. ¨ Sudden vision changes. ¨ Sudden trouble speaking. ¨ Sudden confusion or trouble understanding simple statements. ¨ Sudden problems with walking or balance. ¨ A sudden, severe headache that is different from past headaches. ? · You have severe back or belly pain. ?Do not wait until your blood pressure comes down on its own. Get help right away. ?Call your doctor now or seek immediate care if:  ? · Your blood pressure is much higher than normal (such as 180/110 or higher), but you don't have symptoms. ? · You think high blood pressure is causing symptoms, such as:  ¨ Severe headache. ¨ Blurry vision. ? Watch closely for changes in your health, and be sure to contact your doctor if:  ? · Your blood pressure measures 140/90 or higher at least 2 times. That means the top number is 140 or higher or the bottom number is 90 or higher, or both.    ? · You think you may be having side effects from your blood pressure medicine. ? · Your blood pressure is usually normal, but it goes above normal at least 2 times. Where can you learn more? Go to http://curtis-eveline.info/. Enter S229 in the search box to learn more about \"High Blood Pressure: Care Instructions. \"  Current as of: September 21, 2016  Content Version: 11.4  © 2344-8576 [employer]. Care instructions adapted under license by [employer] (which disclaims liability or warranty for this information). If you have questions about a medical condition or this instruction, always ask your healthcare professional. [place] [address] any warranty or liability for your use of this information.

## 2017-12-19 NOTE — PROGRESS NOTES
Problem: Falls - Risk of  Goal: *Absence of Falls  Document Ying Fall Risk and appropriate interventions in the flowsheet.    Outcome: Progressing Towards Goal  Fall Risk Interventions:            Medication Interventions: Teach patient to arise slowly

## 2017-12-19 NOTE — IP AVS SNAPSHOT
Summary of Care Report The Summary of Care report has been created to help improve care coordination. Users with access to [employer] or [address] (Web-based application) may access additional patient information including the Discharge Summary. If you are not currently a [address] user and need more information, please call the number listed below in the [place] [address] section and ask to be connected with Medical Records. Facility Information Name Address Phone [address] [place] [id number] [phone] Patient Information Patient Name Sex  Candelario Mac ([id number]) Female 1995 Discharge Information Admitting Provider Service Area Unit Eligio Coughlin MD / [address] 2 Labor & Delivery / [phone] Discharge Provider Discharge Date/Time Discharge Disposition Destination (none) 2017 Evening (Pending) AHR (none) Patient Language Language ENGLISH [13] Hospital Problems as of 2017  Reviewed: 3/6/2012 10:29 AM by Finn Bell Class Noted - Resolved Last Modified POA Active Problems Pre-eclampsia  2017 - Present 2017 by Dipesh Quijano MD Unknown Entered by Dipesh Quijano MD  
  
Non-Hospital Problems as of 2017  Reviewed: 3/6/2012 10:29 AM by Finn Putnam Risser Class Noted - Resolved Last Modified Active Problems Seasonal allergic reaction  2010 - Present 2010 by Clemencia Bazan Entered by Clemencia Bazan Obese  2010 - Present 2010 by Clemencia Bazan Entered by Clemencia Bazan Pregnancy  2017 - Present 2017 by Janette Headley, DO Entered by Janette Headley, DO You are allergic to the following No active allergies Current Discharge Medication List  
  
CONTINUE these medications which have NOT CHANGED Dose & Instructions Dispensing Information Comments  
 ibuprofen 800 mg tablet Commonly known as:  MOTRIN Dose:  800 mg Take 1 Tab by mouth every eight (8) hours. Quantity:  40 Tab Refills:  0 PROCARDIA XL 30 mg ER tablet Generic drug:  NIFEdipine ER Notes to Patient:  Take every 12 hours Dose:  30 mg Take 30 mg by mouth daily. Refills:  0 PROzac 10 mg capsule Generic drug:  FLUoxetine Dose:  30 mg Take 30 mg by mouth daily. Indications: Obsessive-Compulsive Disorder, depression Refills:  0  
   
 TYLENOL 325 mg tablet Generic drug:  acetaminophen Dose:  650 mg Take 650 mg by mouth every four (4) hours as needed for Pain. Refills:  0 STOP taking these medications Comments HYDROcodone-acetaminophen 5-325 mg per tablet Commonly known as:  Vel Zhou Follow-up Information Follow up With Details Comments Contact Info None   None (395) Patient stated that they have no PCP None   None (395) Patient stated that they have no PCP None   None (395) Patient stated that they have no PCP Dell Tsang, DO Go in 1 day Keep appointment for Wednesday (12/20) for follow up [address] [place] [address] [address] [facility] 
[phone] Discharge Instructions Depression After Childbirth: Care Instructions Your Care Instructions Many women get the \"baby blues\" during the first few days after childbirth. You may lose sleep, feel irritable, and cry easily. You may feel happy one minute and sad the next. Hormone changes are one cause of these emotional changes. Also, the demands of a new baby, along with visits from relatives or other family needs, add to a mother's stress. The \"baby blues\" often peak around the fourth day. Then they ease up in less than 2 weeks. If your moodiness or anxiety lasts for more than 2 weeks, or if you feel like life is not worth living, you may have postpartum depression. This is different for each mother. Some mothers with serious depression may worry intensely about their infant's well-being. Others may feel distant from their child. Some mothers might even feel that they might harm their baby. A mother may have signs of paranoia, wondering if someone is watching her. Depression is not a sign of weakness. It is a medical condition that requires treatment. Medicine and counseling often work well to reduce depression. Talk to your doctor about taking antidepressant medicine while breastfeeding. Follow-up care is a key part of your treatment and safety. Be sure to make and go to all appointments, and call your doctor if you are having problems. It's also a good idea to know your test results and keep a list of the medicines you take. How do you know if you are depressed? With all the changes in your life, you may not know if you are depressed. Pregnancy sometimes causes changes in how you feel that are similar to the symptoms of depression. Symptoms of depression include: · Feeling sad or hopeless and losing interest in daily activities. These are the most common symptoms of depression. · Sleeping too much or not enough. · Feeling tired. You may feel as if you have no energy. · Eating too much or too little. · Writing or talking about death, such as writing suicide notes or talking about guns, knives, or pills. Keep the numbers for these national suicide hotlines: 3-586-462-TALK (5-145.974.9362) and 3-168-XAIPWBE (7-880.643.6947). If you or someone you know talks about suicide or feeling hopeless, get help right away. How can you care for yourself at home? · Be safe with medicines. Take your medicines exactly as prescribed. Call your doctor if you think you are having a problem with your medicine. · Eat a healthy diet so that you can keep up your energy. · Get regular daily exercise, such as walks, to help improve your mood. · Get as much sunlight as possible. Keep your shades and curtains open. Get outside as much as you can. · Avoid using alcohol or other substances to feel better. · Get as much rest and sleep as possible. Avoid doing too much. Being too tired can increase depression. · Play stimulating music throughout your day and soothing music at night. · Schedule outings and visits with friends and family. Ask them to call you regularly, so that you do not feel alone. · Ask for help with preparing food and other daily tasks. Family and friends are often happy to help a mother with a . · Be honest with yourself and those who care about you. Tell them about your struggle. · Join a support group of new mothers. No one can better understand the challenges of caring for a  than other new mothers. · If you feel like life is not worth living or are feeling hopeless, get help right away. Keep the numbers for these national suicide hotlines: 0-974-874-TALK (3-735.177.6541) and 0-949-JBJXMPB (1-211.105.9946). When should you call for help? Call 911 anytime you think you may need emergency care. For example, call if: 
? · You feel you cannot stop from hurting yourself, your baby, or someone else. ?Call your doctor now or seek immediate medical care if: 
? · You are having trouble caring for yourself or your baby. ? · You hear voices. ? Watch closely for changes in your health, and be sure to contact your doctor if: 
? · You have problems with your depression medicine. ? · You do not get better as expected. Where can you learn more? Go to http://curtis-eveline.info/. Enter I720 in the search box to learn more about \"Depression After Childbirth: Care Instructions. \" Current as of: May 12, 2017 Content Version: 11.4 © 9703-7430 Healthwise, [employer]. Care instructions adapted under license by [employer] (which disclaims liability or warranty for this information). If you have questions about a medical condition or this instruction, always ask your healthcare professional. [address] any warranty or liability for your use of this information. High Blood Pressure: Care Instructions Your Care Instructions If your blood pressure is usually above 140/90, you have high blood pressure, or hypertension. That means the top number is 140 or higher or the bottom number is 90 or higher, or both. Despite what a lot of people think, high blood pressure usually doesn't cause headaches or make you feel dizzy or lightheaded. It usually has no symptoms. But it does increase your risk for heart attack, stroke, and kidney or eye damage. The higher your blood pressure, the more your risk increases. Your doctor will give you a goal for your blood pressure. Your goal will be based on your health and your age. An example of a goal is to keep your blood pressure below 140/90. Lifestyle changes, such as eating healthy and being active, are always important to help lower blood pressure. You might also take medicine to reach your blood pressure goal. 
Follow-up care is a key part of your treatment and safety. Be sure to make and go to all appointments, and call your doctor if you are having problems. It's also a good idea to know your test results and keep a list of the medicines you take. How can you care for yourself at home? Medical treatment · If you stop taking your medicine, your blood pressure will go back up. You may take one or more types of medicine to lower your blood pressure. Be safe with medicines. Take your medicine exactly as prescribed. Call your doctor if you think you are having a problem with your medicine. · Talk to your doctor before you start taking aspirin every day.  Aspirin can help certain people lower their risk of a heart attack or stroke. But taking aspirin isn't right for everyone, because it can cause serious bleeding. · See your doctor regularly. You may need to see the doctor more often at first or until your blood pressure comes down. · If you are taking blood pressure medicine, talk to your doctor before you take decongestants or anti-inflammatory medicine, such as ibuprofen. Some of these medicines can raise blood pressure. · Learn how to check your blood pressure at home. Lifestyle changes · Stay at a healthy weight. This is especially important if you put on weight around the waist. Losing even 10 pounds can help you lower your blood pressure. · If your doctor recommends it, get more exercise. Walking is a good choice. Bit by bit, increase the amount you walk every day. Try for at least 30 minutes on most days of the week. You also may want to swim, bike, or do other activities. · Avoid or limit alcohol. Talk to your doctor about whether you can drink any alcohol. · Try to limit how much sodium you eat to less than 2,300 milligrams (mg) a day. Your doctor may ask you to try to eat less than 1,500 mg a day. · Eat plenty of fruits (such as bananas and oranges), vegetables, legumes, whole grains, and low-fat dairy products. · Lower the amount of saturated fat in your diet. Saturated fat is found in animal products such as milk, cheese, and meat. Limiting these foods may help you lose weight and also lower your risk for heart disease. · Do not smoke. Smoking increases your risk for heart attack and stroke. If you need help quitting, talk to your doctor about stop-smoking programs and medicines. These can increase your chances of quitting for good. When should you call for help? Call 911 anytime you think you may need emergency care.  This may mean having symptoms that suggest that your blood pressure is causing a serious heart or blood vessel problem. Your blood pressure may be over 180/110. ? For example, call 911 if: 
? · You have symptoms of a heart attack. These may include: ¨ Chest pain or pressure, or a strange feeling in the chest. 
¨ Sweating. ¨ Shortness of breath. ¨ Nausea or vomiting. ¨ Pain, pressure, or a strange feeling in the back, neck, jaw, or upper belly or in one or both shoulders or arms. ¨ Lightheadedness or sudden weakness. ¨ A fast or irregular heartbeat. ? · You have symptoms of a stroke. These may include: 
¨ Sudden numbness, tingling, weakness, or loss of movement in your face, arm, or leg, especially on only one side of your body. ¨ Sudden vision changes. ¨ Sudden trouble speaking. ¨ Sudden confusion or trouble understanding simple statements. ¨ Sudden problems with walking or balance. ¨ A sudden, severe headache that is different from past headaches. ? · You have severe back or belly pain. ?Do not wait until your blood pressure comes down on its own. Get help right away. ?Call your doctor now or seek immediate care if: 
? · Your blood pressure is much higher than normal (such as 180/110 or higher), but you don't have symptoms. ? · You think high blood pressure is causing symptoms, such as: ¨ Severe headache. ¨ Blurry vision. ? Watch closely for changes in your health, and be sure to contact your doctor if: 
? · Your blood pressure measures 140/90 or higher at least 2 times. That means the top number is 140 or higher or the bottom number is 90 or higher, or both. ? · You think you may be having side effects from your blood pressure medicine. ? · Your blood pressure is usually normal, but it goes above normal at least 2 times. Where can you learn more? Go to http://curtis-eveline.info/. Enter A342 in the search box to learn more about \"High Blood Pressure: Care Instructions. \" Current as of: September 21, 2016 Content Version: 11.4 © 7958-6797 Healthwise, [employer]. Care instructions adapted under license by [employer] (which disclaims liability or warranty for this information). If you have questions about a medical condition or this instruction, always ask your healthcare professional. [place] [address] any warranty or liability for your use of this information. Chart Review Routing History Recipient Method Report Sent By Mishel Landin DO Fax: [phone] Phone: [phone] Fax BASIC PATIENT INFO Belkis Birmingham MD [[id number]] 9/12/2017 12:35 PM   
 Sung Amaral MD  
Phone: [phone] [facility] Routed Notes Lauri Landin [place] [[id number]] 12/11/2017  5:49 PM 12/11/2017

## 2017-12-19 NOTE — PROGRESS NOTES
Done well today, and w/o c/o, has nifed xl at home  BP reviewed   ok for discharge to nifed 30XL Q12 and f/u in office as planned tomorrow. Warnings reviewed, ques answered.

## 2017-12-19 NOTE — ROUTINE PROCESS
0715: Bedside, Verbal and Written shift change report given to BROOKLYN Sarabia RN (oncoming nurse) by CLARISA Malik RN (offgoing nurse). Report included the following information SBAR, Kardex, Intake/Output, MAR, Accordion and Recent Results. 0730: Dr. Balbina Sinha at pt bedside, MD GREEN give pt procardia dose at this time due to pt bp reading. MD stating plan of care is to monitor pt bp readings and possibly discharge today. 5582: This RN at pt bedside due to pt complaints of feeling \"weird\" with slight dizziness      0820: Dr. Matt Mauricio check bp every 6 hours. MD plan is to possibly discharge pt today. 1416: Dr. Opal Carbone given update on pt. MD stating we will determine plan of care later on today    1419: Dr. Opal Carbone asking this RN to assist pt in setting up appt with Dr. Analia Schwarz on Thursday or Friday to touch base with MD    1430: This RN scheduling appt with Dr. Balbina Sinha on Thursday 21 at 10:30am in [place]. 1657: This RN cancelling pt appt scheduled for Thursday and keeping appt for Wednesday (20th) per pt and Dr. Opal Carbone request.    Talia Roberts: Dr. Opal Carbone at pt bedside at this time. MD GREEN discharge pt at this time and remove IV    1728: Dr. Opal Carbone made aware of pt bp readings. MD stating to retake bp in 30 min and reassess    1802: This RN at pt bedside to assess bp reading. Pt visibly crying and stating \"I just got off the phone with my boyfriend and I'm upset, that is probably why my pressure is high. \" This RN turning off bp machine and attempting to help calm pt down with support and discussion of pt stresses with boyfriend. 1811: This RN notifying Dr. Opal Carbone of pt bp reading. MD stating to retake bp in approximately 30 min once pt has been able to calm down. 1836: Dr. Opal Carbone updated on pt bp reading. MD stating that we will keep patient overnight. TORB discontinue discharge order and TORB 30mg Procardia XL q 12 hours    1920: Bedside, Verbal and Written shift change report given to [facility], RN (oncoming nurse) by BROOKLYN Alexander RN (offgoing nurse). Report included the following information SBAR, Kardex, Intake/Output, MAR, Accordion and Recent Results.

## 2017-12-19 NOTE — PROGRESS NOTES
3:32 AM  Patient arrived to unit, states she was delivered by csection 12/12 for pre-eclampsia and states she was in office today to have staples removed by Dr. Amada Rudd when her pressures were elevated x3 >160 SBP. States she also has an anxiety order that she is prescribed prozac for, has not taken. Patient reports +headache since she left the office this afternoon. States Dr. Amada Rudd started her on procardia XL 30 mg which she took around 7 pm tonight. Reports her BPs at home >160s. Patient states she is still positive for headache. Denies blurry vision. Denies floaters or blurry vision. Denies right sided upper gastric pain. 4:50 AM this RN called Dr. Yesenia Ward for orders. POC to check pressures q4 as patient BPs seem to be stabilizing with rest and relaxation techniques with decreased stimulation in room. Plan to give one time dose of prozac since patient \"forgot\" to take it yesterday. Plan to give one time dose of procardia XL 30 mg at 0700 today. Plan for patient to rest and send repeat pre-e labs. MD does not wish to start 24 urine at this time. 4100 patient sleeping in bed    9464 Patient sleeping on right side in bed    7:05 AM SBAR bedside report to [facility] RN.  Care of patient released at this time

## 2017-12-19 NOTE — IP AVS SNAPSHOT
Kd Perez 
 
 
 [address] 
[phone] Patient: Kate January MRN: [id number] :1995 About your hospitalization You were admitted on:  2017 You last received care in the:  [facility] 2 LABOR & DELIVERY You were discharged on:  2017 Why you were hospitalized Your primary diagnosis was:  Not on File Your diagnoses also included:  Pre-Eclampsia Things You Need To Do (next 8 weeks) Follow up with None Where:  None (395) Patient stated that they have no PCP Follow up with None Where:  None (395) Patient stated that they have no PCP Follow up with None Where:  None (395) Patient stated that they have no PCP Go to Anuradha Carranza DO in 1 day(s) Keep appointment for Wednesday () for follow up Phone:  [phone] Where:  [address], [facility], [address] [place] [address] Follow up with Anuradha Carranza DO  
  call to scheduled a time Phone:  [phone] Where:  [facility], [address] [employer], [address] [place] [address] Discharge Orders None A check arlene indicates which time of day the medication should be taken. My Medications STOP taking these medications HYDROcodone-acetaminophen 5-325 mg per tablet Commonly known as:  [address] these medications as instructed Instructions Each Dose to Equal  
 Morning Noon Evening Bedtime  
 ibuprofen 800 mg tablet Commonly known as:  MOTRIN Take 1 Tab by mouth every eight (8) hours. 800 mg  
    
   
   
   
  
 labetalol 200 mg tablet Commonly known as:  Mariangel [place] Take 1 Tab by mouth every eight (8) hours. 200 mg  
    
   
   
   
  
 * PROCARDIA XL 30 mg ER tablet Generic drug:  NIFEdipine ER Take 30 mg by mouth daily. 30 mg  
    
   
   
   
  
 * NIFEdipine ER 60 mg ER tablet Commonly known as:  PROCARDIA XL Take 1 Tab by mouth daily. 60 mg PROzac 10 mg capsule Generic drug:  FLUoxetine Take 30 mg by mouth daily. Indications: Obsessive-Compulsive Disorder, depression 30 mg  
    
   
   
   
  
 TYLENOL 325 mg tablet Generic drug:  acetaminophen Take 650 mg by mouth every four (4) hours as needed for Pain. 650 mg  
    
   
   
   
  
 * Notice: This list has 2 medication(s) that are the same as other medications prescribed for you. Read the directions carefully, and ask your doctor or other care provider to review them with you. Where to Get Your Medications Information on where to get these meds will be given to you by the nurse or doctor. ! Ask your nurse or doctor about these medications  
  labetalol 200 mg tablet NIFEdipine ER 60 mg ER tablet Discharge Instructions Depression After Childbirth: Care Instructions Your Care Instructions Many women get the \"baby blues\" during the first few days after childbirth. You may lose sleep, feel irritable, and cry easily. You may feel happy one minute and sad the next. Hormone changes are one cause of these emotional changes. Also, the demands of a new baby, along with visits from relatives or other family needs, add to a mother's stress. The \"baby blues\" often peak around the fourth day. Then they ease up in less than 2 weeks. If your moodiness or anxiety lasts for more than 2 weeks, or if you feel like life is not worth living, you may have postpartum depression. This is different for each mother. Some mothers with serious depression may worry intensely about their infant's well-being. Others may feel distant from their child. Some mothers might even feel that they might harm their baby. A mother may have signs of paranoia, wondering if someone is watching her. Depression is not a sign of weakness.  It is a medical condition that requires treatment. Medicine and counseling often work well to reduce depression. Talk to your doctor about taking antidepressant medicine while breastfeeding. Follow-up care is a key part of your treatment and safety. Be sure to make and go to all appointments, and call your doctor if you are having problems. It's also a good idea to know your test results and keep a list of the medicines you take. How do you know if you are depressed? With all the changes in your life, you may not know if you are depressed. Pregnancy sometimes causes changes in how you feel that are similar to the symptoms of depression. Symptoms of depression include: · Feeling sad or hopeless and losing interest in daily activities. These are the most common symptoms of depression. · Sleeping too much or not enough. · Feeling tired. You may feel as if you have no energy. · Eating too much or too little. · Writing or talking about death, such as writing suicide notes or talking about guns, knives, or pills. Keep the numbers for these national suicide hotlines: 4-250-073-TALK (3-100.704.1886) and 1-519-SGIKTNA (1-411.879.4128). If you or someone you know talks about suicide or feeling hopeless, get help right away. How can you care for yourself at home? · Be safe with medicines. Take your medicines exactly as prescribed. Call your doctor if you think you are having a problem with your medicine. · Eat a healthy diet so that you can keep up your energy. · Get regular daily exercise, such as walks, to help improve your mood. · Get as much sunlight as possible. Keep your shades and curtains open. Get outside as much as you can. · Avoid using alcohol or other substances to feel better. · Get as much rest and sleep as possible. Avoid doing too much. Being too tired can increase depression. · Play stimulating music throughout your day and soothing music at night. · Schedule outings and visits with friends and family.  Ask them to call you regularly, so that you do not feel alone. · Ask for help with preparing food and other daily tasks. Family and friends are often happy to help a mother with a . · Be honest with yourself and those who care about you. Tell them about your struggle. · Join a support group of new mothers. No one can better understand the challenges of caring for a  than other new mothers. · If you feel like life is not worth living or are feeling hopeless, get help right away. Keep the numbers for these national suicide hotlines: 8-085-902-TALK (3-541.910.3650) and 7-322-CDSLNRA (8-436.311.9907). When should you call for help? Call 911 anytime you think you may need emergency care. For example, call if: 
? · You feel you cannot stop from hurting yourself, your baby, or someone else. ?Call your doctor now or seek immediate medical care if: 
? · You are having trouble caring for yourself or your baby. ? · You hear voices. ? Watch closely for changes in your health, and be sure to contact your doctor if: 
? · You have problems with your depression medicine. ? · You do not get better as expected. Where can you learn more? Go to http://curtis-eveline.info/. Enter X821 in the search box to learn more about \"Depression After Childbirth: Care Instructions. \" Current as of: May 12, 2017 Content Version: 11.4 © 7840-1039 Healthwise, [employer]. Care instructions adapted under license by [employer] (which disclaims liability or warranty for this information). If you have questions about a medical condition or this instruction, always ask your healthcare professional. [place] [address] any warranty or liability for your use of this information. High Blood Pressure: Care Instructions Your Care Instructions If your blood pressure is usually above 140/90, you have high blood pressure, or hypertension.  That means the top number is 140 or higher or the bottom number is 90 or higher, or both. Despite what a lot of people think, high blood pressure usually doesn't cause headaches or make you feel dizzy or lightheaded. It usually has no symptoms. But it does increase your risk for heart attack, stroke, and kidney or eye damage. The higher your blood pressure, the more your risk increases. Your doctor will give you a goal for your blood pressure. Your goal will be based on your health and your age. An example of a goal is to keep your blood pressure below 140/90. Lifestyle changes, such as eating healthy and being active, are always important to help lower blood pressure. You might also take medicine to reach your blood pressure goal. 
Follow-up care is a key part of your treatment and safety. Be sure to make and go to all appointments, and call your doctor if you are having problems. It's also a good idea to know your test results and keep a list of the medicines you take. How can you care for yourself at home? Medical treatment · If you stop taking your medicine, your blood pressure will go back up. You may take one or more types of medicine to lower your blood pressure. Be safe with medicines. Take your medicine exactly as prescribed. Call your doctor if you think you are having a problem with your medicine. · Talk to your doctor before you start taking aspirin every day. Aspirin can help certain people lower their risk of a heart attack or stroke. But taking aspirin isn't right for everyone, because it can cause serious bleeding. · See your doctor regularly. You may need to see the doctor more often at first or until your blood pressure comes down. · If you are taking blood pressure medicine, talk to your doctor before you take decongestants or anti-inflammatory medicine, such as ibuprofen. Some of these medicines can raise blood pressure. · Learn how to check your blood pressure at home. Lifestyle changes · Stay at a healthy weight. This is especially important if you put on weight around the waist. Losing even 10 pounds can help you lower your blood pressure. · If your doctor recommends it, get more exercise. Walking is a good choice. Bit by bit, increase the amount you walk every day. Try for at least 30 minutes on most days of the week. You also may want to swim, bike, or do other activities. · Avoid or limit alcohol. Talk to your doctor about whether you can drink any alcohol. · Try to limit how much sodium you eat to less than 2,300 milligrams (mg) a day. Your doctor may ask you to try to eat less than 1,500 mg a day. · Eat plenty of fruits (such as bananas and oranges), vegetables, legumes, whole grains, and low-fat dairy products. · Lower the amount of saturated fat in your diet. Saturated fat is found in animal products such as milk, cheese, and meat. Limiting these foods may help you lose weight and also lower your risk for heart disease. · Do not smoke. Smoking increases your risk for heart attack and stroke. If you need help quitting, talk to your doctor about stop-smoking programs and medicines. These can increase your chances of quitting for good. When should you call for help? Call 911 anytime you think you may need emergency care. This may mean having symptoms that suggest that your blood pressure is causing a serious heart or blood vessel problem. Your blood pressure may be over 180/110. ? For example, call 911 if: 
? · You have symptoms of a heart attack. These may include: ¨ Chest pain or pressure, or a strange feeling in the chest. 
¨ Sweating. ¨ Shortness of breath. ¨ Nausea or vomiting. ¨ Pain, pressure, or a strange feeling in the back, neck, jaw, or upper belly or in one or both shoulders or arms. ¨ Lightheadedness or sudden weakness. ¨ A fast or irregular heartbeat. ? · You have symptoms of a stroke. These may include: ¨ Sudden numbness, tingling, weakness, or loss of movement in your face, arm, or leg, especially on only one side of your body. ¨ Sudden vision changes. ¨ Sudden trouble speaking. ¨ Sudden confusion or trouble understanding simple statements. ¨ Sudden problems with walking or balance. ¨ A sudden, severe headache that is different from past headaches. ? · You have severe back or belly pain. ?Do not wait until your blood pressure comes down on its own. Get help right away. ?Call your doctor now or seek immediate care if: 
? · Your blood pressure is much higher than normal (such as 180/110 or higher), but you don't have symptoms. ? · You think high blood pressure is causing symptoms, such as: ¨ Severe headache. ¨ Blurry vision. ? Watch closely for changes in your health, and be sure to contact your doctor if: 
? · Your blood pressure measures 140/90 or higher at least 2 times. That means the top number is 140 or higher or the bottom number is 90 or higher, or both. ? · You think you may be having side effects from your blood pressure medicine. ? · Your blood pressure is usually normal, but it goes above normal at least 2 times. Where can you learn more? Go to http://curtis-eveline.info/. Enter I218 in the search box to learn more about \"High Blood Pressure: Care Instructions. \" Current as of: September 21, 2016 Content Version: 11.4 © 4272-5940 [employer]. Care instructions adapted under license by [employer] (which disclaims liability or warranty for this information). If you have questions about a medical condition or this instruction, always ask your healthcare professional. [place] [address] any warranty or liability for your use of this information. Kii Announcement  We are excited to announce that we are making your provider's discharge notes available to you in Dyn. You will see these notes when they are completed and signed by the physician that discharged you from your recent hospital stay. If you have any questions or concerns about any information you see in Dyn, please call the Health Information Department where you were seen or reach out to your Primary Care Provider for more information about your plan of care. Introducing [facility]! [facility] introduces Dyn patient portal. Now you can access parts of your medical record, email your doctor's office, and request medication refills online. 1. In your internet browser, go to [email or web address] 2. Click on the First Time User? Click Here link in the Sign In box. You will see the New Member Sign Up page. 3. Enter your Dyn Access Code exactly as it appears below. You will not need to use this code after youve completed the sign-up process. If you do not sign up before the expiration date, you must request a new code. · Dyn Access Code: [email or web address] Expires: 2/26/2018  2:10 PM 
 
4. Enter the last four digits of your Social Security Number (xxxx) and Date of Birth (mm/dd/yyyy) as indicated and click Submit. You will be taken to the next sign-up page. 5. Create a Dyn ID. This will be your Dyn login ID and cannot be changed, so think of one that is secure and easy to remember. 6. Create a Dyn password. You can change your password at any time. 7. Enter your Password Reset Question and Answer. This can be used at a later time if you forget your password. 8. Enter your e-mail address. You will receive e-mail notification when new information is available in [address] 9. Click Sign Up. You can now view and download portions of your medical record. 10. Click the Download Summary menu link to download a portable copy of your medical information. If you have questions, please visit the Frequently Asked Questions section of the [employer] website. Remember, Your Survivalt is NOT to be used for urgent needs. For medical emergencies, dial 911. Now available from your iPhone and Android! Providers Seen During Your Hospitalization Provider Specialty Primary office phone Moshe Mayer DO Obstetrics & Gynecology [phone] Your Primary Care Physician (PCP) Primary Care Physician Office Phone Office Fax NONE ** None ** ** None ** You are allergic to the following No active allergies Recent Documentation Height Weight BMI OB Status Smoking Status 1.575 m 130.4 kg 52.57 kg/m2 Recent pregnancy Former Smoker Emergency Contacts Name Discharge Info Relation Home Work Mobile Maryanne Guerreroen DISCHARGE CAREGIVER [3] Other Relative [6] [phone] [address]  Parent [1] [phone] Beatriz Mcwilliams  Boyfriend [17] [phone] Patient Belongings The following personal items are in your possession at time of discharge: 
                             
 
  
  
 Please provide this summary of care documentation to your next provider. Signatures-by signing, you are acknowledging that this After Visit Summary has been reviewed with you and you have received a copy. Patient Signature:  ____________________________________________________________ Date:  ____________________________________________________________  
  
Primo Do Provider Signature:  ____________________________________________________________ Date:  ____________________________________________________________

## 2017-12-20 VITALS
HEART RATE: 81 BPM | RESPIRATION RATE: 20 BRPM | TEMPERATURE: 97.6 F | BODY MASS INDEX: 52.89 KG/M2 | WEIGHT: 287.4 LBS | OXYGEN SATURATION: 97 % | DIASTOLIC BLOOD PRESSURE: 80 MMHG | HEIGHT: 62 IN | SYSTOLIC BLOOD PRESSURE: 134 MMHG

## 2017-12-20 PROCEDURE — 74011250637 HC RX REV CODE- 250/637: Performed by: OBSTETRICS & GYNECOLOGY

## 2017-12-20 PROCEDURE — 74011250637 HC RX REV CODE- 250/637

## 2017-12-20 PROCEDURE — 74011000250 HC RX REV CODE- 250

## 2017-12-20 PROCEDURE — 99218 HC RM OBSERVATION: CPT

## 2017-12-20 PROCEDURE — 96374 THER/PROPH/DIAG INJ IV PUSH: CPT

## 2017-12-20 RX ORDER — NIFEDIPINE 60 MG/1
60 TABLET, EXTENDED RELEASE ORAL DAILY
Qty: 30 TAB | Refills: 2 | Status: SHIPPED | OUTPATIENT
Start: 2017-12-20 | End: 2018-10-01 | Stop reason: ALTCHOICE

## 2017-12-20 RX ORDER — LABETALOL 200 MG/1
TABLET, FILM COATED ORAL
Status: COMPLETED
Start: 2017-12-20 | End: 2017-12-20

## 2017-12-20 RX ORDER — LABETALOL HYDROCHLORIDE 5 MG/ML
INJECTION, SOLUTION INTRAVENOUS
Status: COMPLETED
Start: 2017-12-20 | End: 2017-12-20

## 2017-12-20 RX ORDER — LABETALOL 200 MG/1
200 TABLET, FILM COATED ORAL EVERY 8 HOURS
Status: DISCONTINUED | OUTPATIENT
Start: 2017-12-20 | End: 2017-12-20 | Stop reason: HOSPADM

## 2017-12-20 RX ORDER — NIFEDIPINE 30 MG/1
60 TABLET, EXTENDED RELEASE ORAL DAILY
Status: DISCONTINUED | OUTPATIENT
Start: 2017-12-20 | End: 2017-12-20 | Stop reason: HOSPADM

## 2017-12-20 RX ORDER — LABETALOL HYDROCHLORIDE 5 MG/ML
20 INJECTION, SOLUTION INTRAVENOUS ONCE
Status: COMPLETED | OUTPATIENT
Start: 2017-12-20 | End: 2017-12-20

## 2017-12-20 RX ORDER — LABETALOL 200 MG/1
200 TABLET, FILM COATED ORAL EVERY 8 HOURS
Qty: 30 TAB | Refills: 2 | Status: SHIPPED | OUTPATIENT
Start: 2017-12-20 | End: 2018-10-01 | Stop reason: ALTCHOICE

## 2017-12-20 RX ADMIN — LABETALOL HCL 200 MG: 200 TABLET, FILM COATED ORAL at 08:40

## 2017-12-20 RX ADMIN — LABETALOL HYDROCHLORIDE 20 MG: 5 INJECTION, SOLUTION INTRAVENOUS at 00:51

## 2017-12-20 RX ADMIN — NIFEDIPINE 60 MG: 30 TABLET, FILM COATED, EXTENDED RELEASE ORAL at 08:40

## 2017-12-20 RX ADMIN — LABETALOL HCL 200 MG: 200 TABLET, FILM COATED ORAL at 00:51

## 2017-12-20 RX ADMIN — DOCUSATE SODIUM 100 MG: 100 CAPSULE, LIQUID FILLED ORAL at 08:40

## 2017-12-20 RX ADMIN — IBUPROFEN 800 MG: 800 TABLET ORAL at 08:40

## 2017-12-20 RX ADMIN — LABETALOL HYDROCHLORIDE 20 MG: 5 INJECTION INTRAVENOUS at 00:51

## 2017-12-20 NOTE — PROGRESS NOTES
PostPartum Progress Note    He[facility] Barrett  POD#8 s/p LTCS    Patient admitted for postpartum HTN POD#8 s/p LTCS. She states she does not  have  headache .     Vitals:  Patient Vitals for the past 24 hrs:   BP Temp Pulse Resp SpO2   17 0728 145/82 97.6 °F (36.4 °C) 81 20 -   17 0622 138/84 - 85 - -   17 0306 162/81 - 79 - -   17 0304 - - - - 97 %   17 0303 - - - - 94 %   17 0301 171/86 - 77 - -   17 0259 - - - - 96 %   17 0255 - - - - 94 %   17 0254 - - - - 95 %   17 0249 - - - - 93 %   17 0244 - - - - 93 %   17 0239 - - - - 93 %   17 0234 - - - - 93 %   17 0230 171/76 - 75 - -   17 0229 - - - - 94 %   17 0224 - - - - 94 %   17 0219 - - - - 95 %   17 0215 178/83 - 85 - -   17 0214 - - - - 94 %   17 0213 - - - - 94 %   17 0208 - - - - 93 %   17 0204 - - - - 96 %   17 0202 - - - - 94 %   17 0200 182/86 - 83 - -   17 0159 - - - - 97 %   17 0155 - - - - 93 %   17 0149 - - - - 95 %   17 0145 (!) 180/91 - 82 - -   17 0144 - - - - 96 %   17 0139 - - - - 96 %   17 0134 - - - - 96 %   17 0130 (!) 185/94 - 88 - -   17 0129 - - - - 98 %   17 0124 - - - - 96 %   17 0119 - - - - 95 %   17 0116 (!) 184/97 - 86 - -   17 0114 - - - - 96 %   17 0109 - - - - 96 %   17 0104 - - - - 96 %   17 0059 - - - - 97 %   17 0053 - - - - 93 %   17 0034 (!) 197/96 98.3 °F (36.8 °C) 91 14 -   17 2232 (!) 170/98 - 87 - -   17 2103 (!) 163/98 - 90 - -   17 2053 169/88 98 °F (36.7 °C) 93 16 -   17 1833 (!) 164/98 97.6 °F (36.4 °C) 84 16 -   17 1801 (!) 171/99 - 92 - -   17 1726 161/90 - 91 - -   17 1311 129/75 97.8 °F (36.6 °C) 88 14 -     Temp (24hrs), Av.9 °F (36.6 °C), Min:97.6 °F (36.4 °C), Max:98.3 °F (36.8 °C)    I&O:                 1901 - 12/20 0700  In: 1000 [P.O.:1000]  Out: 4100 [Urine:4100]     Exam:  Patient without distress. Abdomen soft, non-tender               Fundus soft and non tender               Lower extremities edema 1+   INC: C/D/I Steris                                     Labs: No results found for this or any previous visit (from the past 24 hour(s)). Assessment and Plan:   IUP @ Unknown   Patient Active Problem List    Diagnosis Date Noted    Pre-eclampsia 12/19/2017    Pregnancy 12/11/2017    Seasonal allergic reaction 05/07/2010    Obese 05/07/2010     1. PP HTN: Increase Nifedipine to 60mg XL frmo 30mg. Continue Labetalol 200mg tid. F/u with Dr. Etta Viera in office on 12/26.

## 2017-12-20 NOTE — PROGRESS NOTES
1915 - SBAR bedside report from BROOKLYN Cardona RN. Assumed care. 2055 -  Shift assessment. No voiced needs. BP now 169/88  2100 - BP retaken with long cuff on left forearm. 163/98. Incision CDI, pad removed from site and left open to air. 1020 - SBAR bedside report to Darnell Matthews RN. Pt visibly upset. /98. Dr. Sandra Smith notified.    To retake BP in 2 hours after pt has had a chance to rest.

## 2017-12-20 NOTE — PROGRESS NOTES
6:58 AM  Bedside and Verbal shift change report given to MAJOR Vargas RN (oncoming nurse) by Olegario Lawton RN (offgoing nurse). Report included the following information SBAR, Kardex and MAR.   0725  Complete assessment done. Pt denies any needs. Bilateral SCD's on while in bed. Incision JAMARI with steri strips. Site unremarked  0830  Dr Mariola Coffman at the bedside to talk with the pt. Pt being discharged home. Pt aware to f/u with Dr Zelda Csatro on 12/26. Pt aware she has to call the office to scheduled a time. Pt also aware her Procardia XL was increased to 60 daily  8:46 AM  AM meds given pt eating her bkft. Saline lock removed. 80  Went over discharge teaching with the pt. Papers signed and witnessed. Pt received prescriptions for Procardia 60XL and labetalol   0945  Pt discharged home and ambulated off of the unit.   All belongings with the pt

## 2017-12-20 NOTE — PROGRESS NOTES
Eliot Loco 4 report from [employer], [employer] and [email or web address]. Assumed care of the patient at this time. Patient tearful, no needs expressed at this time. Will continue to monitor. 2232  BP elevated 170/98. Per Dr Charlee Grewal, encourage patient to rest and will recheck her BP at 0030.      0035  BP elevated 197/96. Per Dr Aida Hope, order received for 20mg labetalol IVP one time and 200mg PO labetalol TID to be started now. 0050  20mg IVP labetalol and 200mg PO labetalol administered. 0700  SBAR report to [employer].

## 2018-10-01 ENCOUNTER — OFFICE VISIT (OUTPATIENT)
Dept: FAMILY MEDICINE CLINIC | Age: 23
End: 2018-10-01

## 2018-10-01 VITALS
HEART RATE: 74 BPM | TEMPERATURE: 97.8 F | BODY MASS INDEX: 52.44 KG/M2 | DIASTOLIC BLOOD PRESSURE: 86 MMHG | SYSTOLIC BLOOD PRESSURE: 122 MMHG | RESPIRATION RATE: 20 BRPM | WEIGHT: 285 LBS | OXYGEN SATURATION: 98 % | HEIGHT: 62 IN

## 2018-10-01 DIAGNOSIS — F41.9 ANXIETY: ICD-10-CM

## 2018-10-01 DIAGNOSIS — E66.01 OBESITY, MORBID (HCC): ICD-10-CM

## 2018-10-01 DIAGNOSIS — R53.83 FATIGUE, UNSPECIFIED TYPE: ICD-10-CM

## 2018-10-01 DIAGNOSIS — R63.5 WEIGHT GAIN: ICD-10-CM

## 2018-10-01 DIAGNOSIS — G47.00 INSOMNIA, UNSPECIFIED TYPE: ICD-10-CM

## 2018-10-01 DIAGNOSIS — I10 ESSENTIAL HYPERTENSION: Primary | ICD-10-CM

## 2018-10-01 NOTE — PROGRESS NOTES
Subjective:   24 y.o. female for Well Woman Check. She is a returning patient to the practice. Her gyne and breast care is done elsewhere by her Ob-Gyne physician. Patient Active Problem List    Diagnosis Date Noted    Obesity, morbid ([address] 10/01/2018    Pre-eclampsia 12/19/2017    Pregnancy 12/11/2017    Seasonal allergic reaction 05/07/2010    Obese 05/07/2010       History reviewed. No pertinent family history. Social History   Substance Use Topics    Smoking status: Current Every Day Smoker     Packs/day: 0.25     Years: 6.00     Last attempt to quit: 12/12/2017    Smokeless tobacco: Never Used      Comment: quit at delivery of child    Alcohol use Yes        ROS: Feeling generally well. No TIA's or unusual headaches, no dysphagia. No prolonged cough. No dyspnea or chest pain on exertion. No abdominal pain, change in bowel habits, black or bloody stools. No urinary tract symptoms. No new or unusual musculoskeletal symptoms. Specific concerns today: she is having fatigue, weight gain, elevated bp readings, anxiety, and wants to get labs done for eval.    Objective: The patient appears well, alert, oriented x 3, in no distress. Visit Vitals    /86 (BP 1 Location: Left arm, BP Patient Position: Sitting)    Pulse 74    Temp 97.8 °F (36.6 °C) (Oral)    Resp 20    Ht 5' 2\" (1.575 m)    Wt 285 lb (129.3 kg)    LMP 09/26/2018 (Exact Date)    SpO2 98%    BMI 52.13 kg/m2     ENT normal.  Neck supple. No adenopathy or thyromegaly. IGNACIO. Lungs are clear, good air entry, no wheezes, rhonchi or rales. S1 and S2 normal, no murmurs, regular rate and rhythm. Abdomen soft without tenderness, guarding, mass or organomegaly. Extremities show no edema, normal peripheral pulses. Neurological is normal, no focal findings. Breast and Pelvic exams are deferred.     Assessment/Plan:   Well Woman  lose weight, increase physical activity, follow low fat diet, follow low salt diet, routine labs ordered  Encounter Diagnoses   Name Primary?  Essential hypertension Yes    Obesity, morbid ([facility] [address]     Anxiety     Weight gain     Fatigue, unspecified type     Insomnia, unspecified type      Orders Placed This Encounter    CBC WITH AUTOMATED DIFF    METABOLIC PANEL, COMPREHENSIVE    LIPID PANEL    TSH 3RD GENERATION    HEMOGLOBIN A1C WITH EAG     I have discussed the diagnosis with the patient and the intended plan as seen in the above orders. The patient has received an after-visit summary and questions were answered concerning future plans. Patient conveyed understanding of the plan at the time of the visit.     Kristel Morris, MSN, ANP  10/1/2018

## 2018-10-01 NOTE — MR AVS SNAPSHOT
[address] [address] 
[phone] Patient: Audrey Garcia MRN: [id number] :1995 Visit Information Date & Time Provider Department Dept. Phone Encounter #  
 10/1/2018  2:30 PM Charles Vallejo NP [address] [facility] [phone] [id number] Upcoming Health Maintenance Date Due  
 HPV Age 9Y-34Y (1 of 1 - Female 3 Dose Series) 3/7/2005 DTaP/Tdap/Td series (1 - Tdap) 3/7/2015 PAP AKA CERVICAL CYTOLOGY 3/7/2015 Influenza Age 5 to Adult 2018 Allergies as of 10/1/2018  Review Complete On: 10/1/2018 By: Charles Vallejo NP No Known Allergies Current Immunizations  Reviewed on 2017 No immunizations on file. Not reviewed this visit You Were Diagnosed With   
  
 Codes Comments Essential hypertension    -  Primary ICD-10-CM: I10 
ICD-9-CM: 401.9 Obesity, morbid ([facility] [address]     ICD-10-CM: E66.01 
ICD-9-CM: 278.01 Anxiety     ICD-10-CM: F41.9 ICD-9-CM: 300.00 Weight gain     ICD-10-CM: R63.5 ICD-9-CM: 783.1 Fatigue, unspecified type     ICD-10-CM: R53.83 ICD-9-CM: 780.79 Insomnia, unspecified type     ICD-10-CM: G47.00 ICD-9-CM: 780.52 Vitals BP Pulse Temp Resp Height(growth percentile) Weight(growth percentile) 122/86 (BP 1 Location: Left arm, BP Patient Position: Sitting) 74 97.8 °F (36.6 °C) (Oral) 20 5' 2\" (1.575 m) 285 lb (129.3 kg) LMP SpO2 BMI OB Status Smoking Status 2018 (Exact Date) 98% 52.13 kg/m2 Having regular periods Current Every Day Smoker Vitals History BMI and BSA Data Body Mass Index Body Surface Area  
 52.13 kg/m 2 2.38 m 2 Preferred Pharmacy Pharmacy Name Phone [address] [facility] [place] [phone] Your Updated Medication List  
  
Notice  As of 10/1/2018  2:59 PM  
 You have not been prescribed any medications. We Performed the Following CBC WITH AUTOMATED DIFF [45452 CPT(R)] HEMOGLOBIN A1C WITH EAG [65871 CPT(R)] LIPID PANEL [60922 CPT(R)] METABOLIC PANEL, COMPREHENSIVE [81085 CPT(R)] TSH 3RD GENERATION [54806 CPT(R)] Introducing [facility]! [employer] introduces [employer] patient portal. Now you can access parts of your medical record, email your doctor's office, and request medication refills online. 1. In your internet browser, go to [email or web address] 2. Click on the First Time User? Click Here link in the Sign In box. You will see the New Member Sign Up page. 3. Enter your [employer] Access Code exactly as it appears below. You will not need to use this code after youve completed the sign-up process. If you do not sign up before the expiration date, you must request a new code. · [employer] Access Code: [email or web address] Expires: 12/30/2018  2:59 PM 
 
4. Enter the last four digits of your Social Security Number (xxxx) and Date of Birth (mm/dd/yyyy) as indicated and click Submit. You will be taken to the next sign-up page. 5. Create a [employer] ID. This will be your [employer] login ID and cannot be changed, so think of one that is secure and easy to remember. 6. Create a [employer] password. You can change your password at any time. 7. Enter your Password Reset Question and Answer. This can be used at a later time if you forget your password. 8. Enter your e-mail address. You will receive e-mail notification when new information is available in [address] 9. Click Sign Up. You can now view and download portions of your medical record. 10. Click the Download Summary menu link to download a portable copy of your medical information. If you have questions, please visit the Frequently Asked Questions section of the [employer] website. Remember, [employer] is NOT to be used for urgent needs. For medical emergencies, dial 911. Now available from your iPhone and Android! Please provide this summary of care documentation to your next provider. Your primary care clinician is listed as Sue Martinez. If you have any questions after today's visit, please call [phone].

## 2018-10-01 NOTE — PROGRESS NOTES
Chief Complaint   Patient presents with   [address]     re-establishing care    Nasal Congestion     x 3 days     1. Have you been to the ER, urgent care clinic since your last visit? Hospitalized since your last visit? Seen in the [facility] ER a few month ago for BP issues. 2. Have you seen or consulted any other health care providers outside of the [address] [place] [address] since your last visit? Include any pap smears or colon screening.  No    Visit Vitals    BP (!) 135/92 (BP 1 Location: Left arm, BP Patient Position: Sitting)    Pulse 74    Temp 97.8 °F (36.6 °C) (Oral)    Resp 20    Ht 5' 2\" (1.575 m)    Wt 285 lb (129.3 kg)    SpO2 98%    BMI 52.13 kg/m2

## 2018-10-04 LAB
ALBUMIN SERPL-MCNC: 3.7 G/DL (ref 3.5–5.5)
ALBUMIN/GLOB SERPL: 1.2 {RATIO} (ref 1.2–2.2)
ALP SERPL-CCNC: 92 IU/L (ref 39–117)
ALT SERPL-CCNC: 9 IU/L (ref 0–32)
AST SERPL-CCNC: 9 IU/L (ref 0–40)
BASOPHILS # BLD AUTO: 0 X10E3/UL (ref 0–0.2)
BASOPHILS NFR BLD AUTO: 0 %
BILIRUB SERPL-MCNC: 0.3 MG/DL (ref 0–1.2)
BUN SERPL-MCNC: 8 MG/DL (ref 6–20)
BUN/CREAT SERPL: 14 (ref 9–23)
CALCIUM SERPL-MCNC: 9 MG/DL (ref 8.7–10.2)
CHLORIDE SERPL-SCNC: 105 MMOL/L (ref 96–106)
CHOLEST SERPL-MCNC: 253 MG/DL (ref 100–199)
CO2 SERPL-SCNC: 23 MMOL/L (ref 20–29)
CREAT SERPL-MCNC: 0.59 MG/DL (ref 0.57–1)
EOSINOPHIL # BLD AUTO: 0.1 X10E3/UL (ref 0–0.4)
EOSINOPHIL NFR BLD AUTO: 1 %
ERYTHROCYTE [DISTWIDTH] IN BLOOD BY AUTOMATED COUNT: 14.9 % (ref 12.3–15.4)
EST. AVERAGE GLUCOSE BLD GHB EST-MCNC: 100 MG/DL
GLOBULIN SER CALC-MCNC: 3.1 G/DL (ref 1.5–4.5)
GLUCOSE SERPL-MCNC: 87 MG/DL (ref 65–99)
HBA1C MFR BLD: 5.1 % (ref 4.8–5.6)
HCT VFR BLD AUTO: 42.8 % (ref 34–46.6)
HDLC SERPL-MCNC: 39 MG/DL
HGB BLD-MCNC: 13.9 G/DL (ref 11.1–15.9)
IMM GRANULOCYTES # BLD: 0 X10E3/UL (ref 0–0.1)
IMM GRANULOCYTES NFR BLD: 0 %
INTERPRETATION, 910389: NORMAL
LDLC SERPL CALC-MCNC: 165 MG/DL (ref 0–99)
LYMPHOCYTES # BLD AUTO: 2.2 X10E3/UL (ref 0.7–3.1)
LYMPHOCYTES NFR BLD AUTO: 24 %
MCH RBC QN AUTO: 28.6 PG (ref 26.6–33)
MCHC RBC AUTO-ENTMCNC: 32.5 G/DL (ref 31.5–35.7)
MCV RBC AUTO: 88 FL (ref 79–97)
MONOCYTES # BLD AUTO: 0.6 X10E3/UL (ref 0.1–0.9)
MONOCYTES NFR BLD AUTO: 6 %
NEUTROPHILS # BLD AUTO: 6.3 X10E3/UL (ref 1.4–7)
NEUTROPHILS NFR BLD AUTO: 69 %
PLATELET # BLD AUTO: 324 X10E3/UL (ref 150–379)
POTASSIUM SERPL-SCNC: 4.4 MMOL/L (ref 3.5–5.2)
PROT SERPL-MCNC: 6.8 G/DL (ref 6–8.5)
RBC # BLD AUTO: 4.86 X10E6/UL (ref 3.77–5.28)
SODIUM SERPL-SCNC: 141 MMOL/L (ref 134–144)
TRIGL SERPL-MCNC: 243 MG/DL (ref 0–149)
TSH SERPL DL<=0.005 MIU/L-ACNC: 3.18 UIU/ML (ref 0.45–4.5)
VLDLC SERPL CALC-MCNC: 49 MG/DL (ref 5–40)
WBC # BLD AUTO: 9.2 X10E3/UL (ref 3.4–10.8)

## 2018-10-04 NOTE — PROGRESS NOTES
Hey there, your labs overall look much better for anemia and your blood count is back to normal range. Your cholesterol is crazy high!! Watch the diet for red meat/pork, dairy products, and fried/fast foods.   Recheck fasting in 6 months, Tika

## 2022-03-18 PROBLEM — O14.90 PRE-ECLAMPSIA: Status: ACTIVE | Noted: 2017-12-19

## 2022-03-19 PROBLEM — E66.01 OBESITY, MORBID (HCC): Status: ACTIVE | Noted: 2018-10-01

## 2022-03-19 PROBLEM — Z34.90 PREGNANCY: Status: ACTIVE | Noted: 2017-12-11

## 2022-04-20 NOTE — PROGRESS NOTES
Post-Operative Day Number 1 Progress Note    Patient doing well post-op day 1 from  delivery without significant complaints. Pain controlled on current medication. No flatus. No HA, vision changes, increased swelling. Vitals:  Patient Vitals for the past 8 hrs:   BP Temp Pulse Resp SpO2   17 0814 126/70 - (!) 107 - -   17 0812 - - - - 96 %   17 0808 - - - - 93 %   17 0802 - - - - 94 %   17 0758 - - - - 93 %   17 0757 - - - - 94 %   17 0752 - - - - 94 %   17 0748 - - - - 93 %   17 0743 - - - - 93 %   17 0737 - - - - 93 %   17 0733 - - - - 92 %   17 0732 - - - - 95 %   17 0728 - - - - 92 %   17 0725 135/76 - (!) 102 - -   17 0722 - - - - 95 %   17 0718 - - - - 93 %   1717 - - - - 95 %   17 0712 - - - - 96 %   17 0707 - - - - 95 %   17 0702 - - - - 94 %   17 0625 141/75 - 93 - -   17 0525 135/77 - (!) 102 - -   17 0427 127/75 - 98 - -   17 0407 142/83 - 98 - -   17 0353 - - - - 94 %   17 0352 131/76 - (!) 104 - -   17 0338 - - - - 95 %   17 0337 137/80 98.3 °F (36.8 °C) (!) 107 16 -   17 0307 134/78 - (!) 114 - -   17 0252 128/66 - (!) 116 - 93 %   17 0237 148/71 - 94 - -   17 0222 140/74 - 96 - -   17 0207 127/69 - 96 - -   17 0152 114/70 - (!) 101 - -   17 0137 123/72 - (!) 101 - -   17 0122 131/68 - 92 - -   17 0108 - - - - 95 %   17 0107 91/65 - (!) 101 - -   17 0052 108/55 - (!) 101 - -   17 0037 100/55 - 100 - -     Temp (24hrs), Av.2 °F (36.8 °C), Min:97.7 °F (36.5 °C), Max:98.9 °F (37.2 °C)      Vital signs stable, afebrile. Exam:  Patient without distress. Abdomen Obese,soft, fundus firm below umbilicus,minimally tender.   Exam limited               Incision dressed               Lower extremities are negative for swelling, cords or tenderness. Lab/Data Review: All lab results for the last 24 hours reviewed. Assessment and Plan:  Patient appears to be having uncomplicated post- course. Continue routine post-op care and maternal education. 2. Severe preeclampsia--BPs stable and pt still on Magnesium. Probably discontinue mag this afternoon. Substance abuse/Impulsivity

## 2022-06-08 ENCOUNTER — HOSPITAL ENCOUNTER (EMERGENCY)
Age: 27
Discharge: HOME | End: 2022-06-08
Attending: EMERGENCY MEDICINE
Payer: COMMERCIAL

## 2022-06-08 VITALS
OXYGEN SATURATION: 99 % | WEIGHT: 255 LBS | HEIGHT: 62 IN | RESPIRATION RATE: 18 BRPM | HEART RATE: 93 BPM | TEMPERATURE: 97.9 F | SYSTOLIC BLOOD PRESSURE: 147 MMHG | DIASTOLIC BLOOD PRESSURE: 95 MMHG | BODY MASS INDEX: 46.93 KG/M2

## 2022-06-08 DIAGNOSIS — M79.605 LEFT LEG PAIN: Primary | ICD-10-CM

## 2022-06-08 DIAGNOSIS — I83.92 VARICOSE VEINS OF LEFT LOWER EXTREMITY, UNSPECIFIED WHETHER COMPLICATED: ICD-10-CM

## 2022-06-08 PROCEDURE — 99283 EMERGENCY DEPT VISIT LOW MDM: CPT

## 2022-06-08 RX ORDER — KETOROLAC TROMETHAMINE 10 MG/1
10 TABLET, FILM COATED ORAL
Qty: 20 TABLET | Refills: 0 | Status: SHIPPED | OUTPATIENT
Start: 2022-06-08 | End: 2022-06-13

## 2022-06-08 NOTE — ED TRIAGE NOTES
Patient presents to ER with left leg pain and swelling for approx 1 week, reports varicose veins as well and just wanted someone to check it out for her.

## 2022-06-08 NOTE — Clinical Note
[address] [facility] EMERGENCY DEPARTMENT  [address] [facility] [address]  [phone]    Work/School Note    Date: 6/8/2022    To Whom It May concern:    Ursula Frank was seen and treated today in the emergency room by the following provider(s):  Attending Provider: Swathi Romero MD.      Ursula Frank is excused from work/school on 06/08/22 and 06/09/22. She is medically clear to return to work/school on 6/10/2022.        Sincerely,          Sebastián Burgos MD

## 2022-06-08 NOTE — ED PROVIDER NOTES
EMERGENCY DEPARTMENT HISTORY AND PHYSICAL EXAM      Date: 2022  Patient Name: Rajinder Mauricio    History of Presenting Illness     Chief Complaint   Patient presents with    Leg Swelling       History Provided By: Patient    HPI: Rajinder Mauricio, 28 y.o. female with a past medical history significant No significant past medical history presents to the ED with cc of 1 year of left leg pain and swelling. Patient reports pain is sharp, diffusely posterior, worse with palpation or walking, improved with rest.  Patient also reports concern for varicose veins anteriorly in her left lower leg. Patient denies fevers or chills, denies nausea or vomiting. There are no other complaints, changes, or physical findings at this time. PCP: Enrique Clemente NP    No current facility-administered medications on file prior to encounter. No current outpatient medications on file prior to encounter. Past History     Past Medical History:  Past Medical History:   Diagnosis Date    Depression     OCD (obsessive compulsive disorder)     Pre-eclampsia 2017    Psychiatric problem     depression and OCD       Past Surgical History:  Past Surgical History:   Procedure Laterality Date    ND  DELIVERY ONLY         Family History:  History reviewed. No pertinent family history. 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 HENT: Negative for sinus pressure and sinus pain.     Eyes: Negative for photophobia and redness. Respiratory: Negative for shortness of breath and wheezing. Cardiovascular: Negative for chest pain and palpitations. Gastrointestinal: Negative for abdominal pain and nausea. Genitourinary: Negative for flank pain and hematuria. Musculoskeletal: Positive for left leg pain, negative for gait problem. Skin: Negative for color change and pallor. Neurological: Negative for dizziness and weakness. Review of Systems    Physical Exam   Physical Exam  Constitutional:       General: No acute distress. Appearance: Normal appearance. Not toxic-appearing. HENT:      Head: Normocephalic and atraumatic. Nose: Nose normal.      Mouth/Throat:      Mouth: Mucous membranes are moist.   Eyes:      Extraocular Movements: Extraocular movements intact. Pupils: Pupils are equal, round, and reactive to light. Cardiovascular:      Rate and Rhythm: Normal rate. Pulses: Normal pulses. Pulmonary:      Effort: Pulmonary effort is normal.      Breath sounds: No stridor. Abdominal:      General: Abdomen is flat. There is no distension. Musculoskeletal:         General: Normal range of motion. Lower extremity: Left lower extremity with no palpable cords, negative Homans' sign, no appreciable swelling, anterior varicose veins noted. Skin:     General: Skin is warm and dry. Capillary Refill: Capillary refill takes less than 2 seconds. Neurological:      General: No focal deficit present. Mental Status: Aert and oriented to person, place, and time. Psychiatric:         Mood and Affect: Mood normal.         Behavior: Behavior normal.       Physical Exam    Lab and Diagnostic Study Results     Labs -   No results found for this or any previous visit (from the past 12 hour(s)).     Radiologic Studies -   @lastxrresult@  CT Results  (Last 48 hours)    None        CXR Results  (Last 48 hours)    None            Medical Decision Making   - I am the first provider for this patient. - I reviewed the vital signs, available nursing notes, past medical history, past surgical history, family history and social history. - Initial assessment performed. The patients presenting problems have been discussed, and they are in agreement with the care plan formulated and outlined with them. I have encouraged them to ask questions as they arise throughout their visit. Vital Signs-Reviewed the patient's vital signs. Patient Vitals for the past 12 hrs:   Temp Pulse Resp BP SpO2   06/08/22 1835 97.9 °F (36.6 °C) 93 18 (!) 147/95 99 %         Disposition   Disposition: DC- Adult Discharges: All of the diagnostic tests were reviewed and questions answered. Diagnosis, care plan and treatment options were discussed. The patient understands the instructions and will follow up as directed. The patients results have been reviewed with them. They have been counseled regarding their diagnosis. The patient verbally convey understanding and agreement of the signs, symptoms, diagnosis, treatment and prognosis and additionally agrees to follow up as recommended with their PCP in 24 - 48 hours. They also agree with the care-plan and convey that all of their questions have been answered. I have also put together some discharge instructions for them that include: 1) educational information regarding their diagnosis, 2) how to care for their diagnosis at home, as well a 3) list of reasons why they would want to return to the ED prior to their follow-up appointment, should their condition change. Discharged    DISCHARGE PLAN:  1. There are no discharge medications for this patient. 2.   Follow-up Information     Follow up With Specialties Details Why Contact Ras Deleon NP Family Medicine In 2 days  [address]  [phone]          3. Return to ED if worse   4.    Current Discharge Medication List      START taking these medications    Details ketorolac (TORADOL) 10 mg tablet Take 1 Tablet by mouth every six (6) hours as needed for Pain for up to 5 days. Qty: 20 Tablet, Refills: 0  Start date: 6/8/2022, End date: 6/13/2022               Diagnosis     Clinical Impression:   1. Left leg pain    2. Varicose veins of left lower extremity, unspecified whether complicated        Attestations:    Stacie Del Rosario MD    Please note that this dictation was completed with [employer], the computer voice recognition software. Quite often unanticipated grammatical, syntax, homophones, and other interpretive errors are inadvertently transcribed by the computer software. Please disregard these errors. Please excuse any errors that have escaped final proofreading. Thank you.

## 2022-09-13 ENCOUNTER — NURSE TRIAGE (OUTPATIENT)
Dept: OTHER | Facility: CLINIC | Age: 27
End: 2022-09-13

## 2022-09-13 NOTE — TELEPHONE ENCOUNTER
Received call from Rajinder Pardo at [facility] with [employer]. Subjective: Caller states \"Has some swelling in leg for the past few years. I went before because it went numb, and they have done ultrasounds before. I did go to the hospital in [facility] last month, they told me I have varicose veins and they wanted to get it checked out. It hasn't changed since I was seen in the hospital.\"     Current Symptoms: chronic left leg swelling    Did not triage d/t no new or worsening symptoms since previously seen. Patient/caller agrees with recommended disposition. Writer left message with [facility] for return call to patient for scheduling. Attention Provider: Thank you for allowing me to participate in the care of your patient. The patient was connected to triage in response to information provided to the ECC. Please do not respond through this encounter as the response is not directed to a shared pool. Reason for Disposition   Caller has already spoken with the PCP and has no further questions.     Protocols used: No Contact or Duplicate Contact Call-ADULT-

## 2022-10-17 ENCOUNTER — TELEPHONE (OUTPATIENT)
Dept: FAMILY MEDICINE CLINIC | Age: 27
End: 2022-10-17

## 2022-10-17 NOTE — TELEPHONE ENCOUNTER
----- Message from [facility] sent at 10/17/2022  3:04 PM EDT -----  Subject: Referral Request    Reason for referral request? Patient has a virtual visit on 11/2/2022 and   would like to have labs ordered so she ca have blood work complete prior   to visit. Please reach out to patient once labs are ordered   Provider patient wants to be referred to(if known):     Provider Phone Number(if known):     Additional Information for Provider?   ---------------------------------------------------------------------------  --------------  [address]    [phone]; OK to leave message on voicemail, OK to respond with   electronic message via [employer] portal (only for patients who have   registered [employer] account)  ---------------------------------------------------------------------------  --------------

## 2022-11-02 ENCOUNTER — VIRTUAL VISIT (OUTPATIENT)
Dept: FAMILY MEDICINE CLINIC | Age: 27
End: 2022-11-02
Payer: COMMERCIAL

## 2022-11-02 DIAGNOSIS — K21.9 GASTROESOPHAGEAL REFLUX DISEASE WITHOUT ESOPHAGITIS: Primary | ICD-10-CM

## 2022-11-02 DIAGNOSIS — J45.21 MILD INTERMITTENT ASTHMA WITH ACUTE EXACERBATION: ICD-10-CM

## 2022-11-02 DIAGNOSIS — I10 PRIMARY HYPERTENSION: ICD-10-CM

## 2022-11-02 DIAGNOSIS — J30.2 SEASONAL ALLERGIC REACTION: ICD-10-CM

## 2022-11-02 PROCEDURE — 99204 OFFICE O/P NEW MOD 45 MIN: CPT | Performed by: NURSE PRACTITIONER

## 2022-11-02 RX ORDER — HYDROCHLOROTHIAZIDE 12.5 MG/1
12.5 TABLET ORAL DAILY
Qty: 30 TABLET | Refills: 5 | Status: SHIPPED | OUTPATIENT
Start: 2022-11-02

## 2022-11-02 RX ORDER — ALBUTEROL SULFATE 90 UG/1
2 AEROSOL, METERED RESPIRATORY (INHALATION)
Qty: 18 G | Refills: 1 | Status: SHIPPED | OUTPATIENT
Start: 2022-11-02

## 2022-11-02 RX ORDER — PANTOPRAZOLE SODIUM 40 MG/1
40 TABLET, DELAYED RELEASE ORAL DAILY
Qty: 30 TABLET | Refills: 5 | Status: SHIPPED | OUTPATIENT
Start: 2022-11-02

## 2022-11-02 NOTE — PROGRESS NOTES
Jovanna Aiken (: 1995) is a 28 y.o. female, new patient, here for evaluation of the following chief complaint(s):   No chief complaint on file. ASSESSMENT/PLAN:  Below is the assessment and plan developed based on review of pertinent history, labs, studies, and medications. Diagnoses and all orders for this visit:    1. Gastroesophageal reflux disease without esophagitis    2. Seasonal allergic reaction    3. Primary hypertension    4. Mild intermittent asthma with acute exacerbation    Other orders  -     hydroCHLOROthiazide (HYDRODIURIL) 12.5 mg tablet; Take 1 Tablet by mouth daily. -     pantoprazole (PROTONIX) 40 mg tablet; Take 1 Tablet by mouth daily. -     albuterol (PROVENTIL HFA, VENTOLIN HFA, PROAIR HFA) 90 mcg/actuation inhaler; Take 2 Puffs by inhalation every four (4) hours as needed for Wheezing.     Start protonix 40mg a day for gerd  Refilled albuterol inhaler  Given hctz 12.5 for fluid  Must make an appt for eval in office with labs  Will not tackle everything when have not been seen in 4 years    SUBJECTIVE/OBJECTIVE:  HPI  Patient has not been seen in 4yrs  Went to er for leg swelling  Did not do anything and referred to pcp  No red or painful, does have varicose veins, bp has been running high and edema can be pitting  At home bp readings have been 208+ systolic  Having severe GERD as well, would like med to help  Has several other issues that she wants to address via VV as well    Review of Systems   A comprehensive review of system was obtained and negative except findings in the HPI      No data recorded     Physical Exam    [INSTRUCTIONS:  \"[x]\" Indicates a positive item  \"[]\" Indicates a negative item  -- DELETE ALL ITEMS NOT EXAMINED]    Constitutional: [x] Appears well-developed and well-nourished [x] No apparent distress      [] Abnormal -     Mental status: [x] Alert and awake  [x] Oriented to person/place/time [x] Able to follow commands    [] Abnormal -     Eyes: EOM    [x]  Normal    [] Abnormal -   Sclera  [x]  Normal    [] Abnormal -          Discharge [x]  None visible   [] Abnormal -     HENT: [x] Normocephalic, atraumatic  [] Abnormal -   [x] Mouth/Throat: Mucous membranes are moist    External Ears [x] Normal  [] Abnormal -    Neck: [x] No visualized mass [] Abnormal -     Pulmonary/Chest: [x] Respiratory effort normal   [x] No visualized signs of difficulty breathing or respiratory distress        [] Abnormal -      Musculoskeletal:   [x] Normal gait with no signs of ataxia         [x] Normal range of motion of neck        [] Abnormal -     Neurological:        [x] No Facial Asymmetry (Cranial nerve 7 motor function) (limited exam due to video visit)          [x] No gaze palsy        [] Abnormal -          Skin:        [x] No significant exanthematous lesions or discoloration noted on facial skin         [] Abnormal -            Psychiatric:       [x] Normal Affect [] Abnormal -        [x] No Hallucinations    Other pertinent observable physical exam findings:-    On this date 11/02/2022 I have spent 25 minutes reviewing previous notes, test results and face to face (virtual) with the patient discussing the diagnosis and importance of compliance with the treatment plan as well as documenting on the day of the visit. Galo Vera, was evaluated through a synchronous (real-time) audio-video encounter. The patient (or guardian if applicable) is aware that this is a billable service, which includes applicable co-pays. This Virtual Visit was conducted with patient's (and/or legal guardian's) consent. The visit was conducted pursuant to the emergency declaration under the [address] [place] [address], [address] [place] [address] authority and the [employer] Act. Patient identification was verified, and a caregiver was present when appropriate.   The patient was located at: Home: [address] [place] [address] [address] [address]  The provider was located at: Home: [unfilled]       An electronic signature was used to authenticate this note.   -- Hay Foster NP

## 2022-12-08 ENCOUNTER — TELEPHONE (OUTPATIENT)
Dept: FAMILY MEDICINE CLINIC | Age: 27
End: 2022-12-08

## 2023-01-23 ENCOUNTER — OFFICE VISIT (OUTPATIENT)
Dept: FAMILY MEDICINE CLINIC | Age: 28
End: 2023-01-23
Payer: COMMERCIAL

## 2023-01-23 VITALS
DIASTOLIC BLOOD PRESSURE: 91 MMHG | RESPIRATION RATE: 18 BRPM | OXYGEN SATURATION: 98 % | SYSTOLIC BLOOD PRESSURE: 137 MMHG | HEIGHT: 62 IN | HEART RATE: 94 BPM | WEIGHT: 258 LBS | BODY MASS INDEX: 47.48 KG/M2

## 2023-01-23 DIAGNOSIS — Z11.59 ENCOUNTER FOR HEPATITIS C SCREENING TEST FOR LOW RISK PATIENT: ICD-10-CM

## 2023-01-23 DIAGNOSIS — Z00.00 WELL ADULT EXAM: Primary | ICD-10-CM

## 2023-01-23 DIAGNOSIS — K21.9 GASTROESOPHAGEAL REFLUX DISEASE WITHOUT ESOPHAGITIS: ICD-10-CM

## 2023-01-23 DIAGNOSIS — I10 PRIMARY HYPERTENSION: ICD-10-CM

## 2023-01-23 DIAGNOSIS — F41.9 ANXIETY: ICD-10-CM

## 2023-01-23 PROCEDURE — 99395 PREV VISIT EST AGE 18-39: CPT | Performed by: NURSE PRACTITIONER

## 2023-01-23 PROCEDURE — 3075F SYST BP GE 130 - 139MM HG: CPT | Performed by: NURSE PRACTITIONER

## 2023-01-23 PROCEDURE — 3080F DIAST BP >= 90 MM HG: CPT | Performed by: NURSE PRACTITIONER

## 2023-01-23 RX ORDER — ESCITALOPRAM OXALATE 10 MG/1
10 TABLET ORAL DAILY
Qty: 30 TABLET | Refills: 5 | Status: SHIPPED | OUTPATIENT
Start: 2023-01-23

## 2023-01-23 RX ORDER — HYDROXYZINE 25 MG/1
25 TABLET, FILM COATED ORAL
Qty: 30 TABLET | Refills: 2 | Status: SHIPPED | OUTPATIENT
Start: 2023-01-23

## 2023-01-23 RX ORDER — HYDROXYZINE 25 MG/1
25 TABLET, FILM COATED ORAL
Qty: 30 TABLET | Refills: 2 | Status: SHIPPED | OUTPATIENT
Start: 2023-01-23 | End: 2023-01-23 | Stop reason: SDUPTHER

## 2023-01-23 RX ORDER — ESCITALOPRAM OXALATE 10 MG/1
10 TABLET ORAL DAILY
Qty: 30 TABLET | Refills: 5 | Status: SHIPPED | OUTPATIENT
Start: 2023-01-23 | End: 2023-01-23 | Stop reason: SDUPTHER

## 2023-01-23 NOTE — PROGRESS NOTES
Chief Complaint   Patient presents with    Hypertension     Vitals:    01/23/23 1414   BP: (!) 137/91   BP 1 Location: Right upper arm   BP Patient Position: Sitting   BP Cuff Size: Adult   Pulse: 94   Resp: 18   Height: 5' 2\" (1.575 m)   Weight: 258 lb (117 kg)   SpO2: 98%     1. Have you been to the ER, urgent care clinic since your last visit? Hospitalized since your last visit? No    2. Have you seen or consulted any other health care providers outside of the [address] [place] [address] since your last visit? Include any pap smears or colon screening.  No

## 2023-01-23 NOTE — PROGRESS NOTES
Subjective:   28 y.o. female for Well Woman Check. Her gyne and breast care is done elsewhere by her Ob-Gyne physician. Patient Active Problem List    Diagnosis Date Noted    Obesity, morbid ([address] 10/01/2018    Pre-eclampsia 2017    Pregnancy 2017    Seasonal allergic reaction 2010    Obese 2010     Current Outpatient Medications   Medication Sig Dispense Refill    escitalopram oxalate (LEXAPRO) 10 mg tablet Take 1 Tablet by mouth daily. 30 Tablet 5    hydrOXYzine HCL (ATARAX) 25 mg tablet Take 1 Tablet by mouth three (3) times daily as needed for Anxiety. 30 Tablet 2    hydroCHLOROthiazide (HYDRODIURIL) 12.5 mg tablet Take 1 Tablet by mouth daily. 30 Tablet 5    pantoprazole (PROTONIX) 40 mg tablet Take 1 Tablet by mouth daily. 30 Tablet 5    albuterol (PROVENTIL HFA, VENTOLIN HFA, PROAIR HFA) 90 mcg/actuation inhaler Take 2 Puffs by inhalation every four (4) hours as needed for Wheezing. 18 g 1     History reviewed. No pertinent family history. 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 No TIA's or unusual headaches, no dysphagia. No prolonged cough. No dyspnea or chest pain on exertion. No abdominal pain, change in bowel habits, black or bloody stools. No urinary tract symptoms. No new or unusual musculoskeletal symptoms. Specific concerns today: she is having severe anxiety and panic attacks; bp is also high because of this; got a tatoo by a friend and would like to have hep C check. Objective: The patient appears well, alert, oriented x 3, in no distress.   Visit Vitals  BP (!) 137/91 (BP 1 Location: Right upper arm, BP Patient Position: Sitting, BP Cuff Size: Adult)   Pulse 94   Resp 18   Ht 5' 2\" (1.575 m)   Wt 258 lb (117 kg)   LMP 01/16/2023 (Approximate)   SpO2 98%   BMI 47.19 kg/m²     ENT normal.  Neck supple. No adenopathy or thyromegaly. IGNACIO. Lungs are clear, good air entry, no wheezes, rhonchi or rales. S1 and S2 normal, no murmurs, regular rate and rhythm. Abdomen soft without tenderness, guarding, mass or organomegaly. Extremities show no edema, normal peripheral pulses. Neurological is normal, no focal findings. Breast and Pelvic exams are deferred. Assessment/Plan:   Well Woman  lose weight, increase physical activity, follow low fat diet, follow low salt diet, routine labs ordered  Encounter Diagnoses   Name Primary? Well adult exam Yes    Primary hypertension     Encounter for hepatitis C screening test for low risk patient     Gastroesophageal reflux disease without esophagitis     Anxiety      Orders Placed This Encounter    CBC WITH AUTOMATED DIFF    METABOLIC PANEL, COMPREHENSIVE    TSH 3RD GENERATION    LIPID PANEL    HEPATITIS C AB    DISCONTD: escitalopram oxalate (LEXAPRO) 10 mg tablet    DISCONTD: hydrOXYzine HCL (ATARAX) 25 mg tablet    escitalopram oxalate (LEXAPRO) 10 mg tablet    hydrOXYzine HCL (ATARAX) 25 mg tablet     Given lexapro 10mg and hydroxyzine for panic attacks  Labs updated  Recheck bp and meds 3 weeks    I have discussed the diagnosis with the patient and the intended plan as seen in the above orders. The patient has received an after-visit summary and questions were answered concerning future plans. Patient conveyed understanding of the plan at the time of the visit.     Shaina Muñiz, MSN, ANP  1/23/2023

## 2023-01-24 LAB
ALBUMIN SERPL-MCNC: 3.1 G/DL (ref 3.5–5)
ALBUMIN/GLOB SERPL: 0.9 (ref 1.1–2.2)
ALP SERPL-CCNC: 83 U/L (ref 45–117)
ALT SERPL-CCNC: 46 U/L (ref 12–78)
ANION GAP SERPL CALC-SCNC: 4 MMOL/L (ref 5–15)
AST SERPL-CCNC: 24 U/L (ref 15–37)
BASOPHILS # BLD: 0.1 K/UL (ref 0–0.1)
BASOPHILS NFR BLD: 1 % (ref 0–1)
BILIRUB SERPL-MCNC: 0.3 MG/DL (ref 0.2–1)
BUN SERPL-MCNC: 8 MG/DL (ref 6–20)
BUN/CREAT SERPL: 14 (ref 12–20)
CALCIUM SERPL-MCNC: 8.9 MG/DL (ref 8.5–10.1)
CHLORIDE SERPL-SCNC: 108 MMOL/L (ref 97–108)
CHOLEST SERPL-MCNC: 281 MG/DL
CO2 SERPL-SCNC: 30 MMOL/L (ref 21–32)
CREAT SERPL-MCNC: 0.58 MG/DL (ref 0.55–1.02)
DIFFERENTIAL METHOD BLD: NORMAL
EOSINOPHIL # BLD: 0.2 K/UL (ref 0–0.4)
EOSINOPHIL NFR BLD: 3 % (ref 0–7)
ERYTHROCYTE [DISTWIDTH] IN BLOOD BY AUTOMATED COUNT: 11.9 % (ref 11.5–14.5)
GLOBULIN SER CALC-MCNC: 3.5 G/DL (ref 2–4)
GLUCOSE SERPL-MCNC: 85 MG/DL (ref 65–100)
HCT VFR BLD AUTO: 45.8 % (ref 35–47)
HCV AB SERPL QL IA: NONREACTIVE
HDLC SERPL-MCNC: 35 MG/DL
HDLC SERPL: 8 (ref 0–5)
HGB BLD-MCNC: 14.8 G/DL (ref 11.5–16)
IMM GRANULOCYTES # BLD AUTO: 0 K/UL (ref 0–0.04)
IMM GRANULOCYTES NFR BLD AUTO: 0 % (ref 0–0.5)
LDLC SERPL CALC-MCNC: 183.6 MG/DL (ref 0–100)
LYMPHOCYTES # BLD: 2.3 K/UL (ref 0.8–3.5)
LYMPHOCYTES NFR BLD: 35 % (ref 12–49)
MCH RBC QN AUTO: 30.1 PG (ref 26–34)
MCHC RBC AUTO-ENTMCNC: 32.3 G/DL (ref 30–36.5)
MCV RBC AUTO: 93.3 FL (ref 80–99)
MONOCYTES # BLD: 0.5 K/UL (ref 0–1)
MONOCYTES NFR BLD: 8 % (ref 5–13)
NEUTS SEG # BLD: 3.5 K/UL (ref 1.8–8)
NEUTS SEG NFR BLD: 53 % (ref 32–75)
NRBC # BLD: 0 K/UL (ref 0–0.01)
NRBC BLD-RTO: 0 PER 100 WBC
PLATELET # BLD AUTO: 285 K/UL (ref 150–400)
PMV BLD AUTO: 12.2 FL (ref 8.9–12.9)
POTASSIUM SERPL-SCNC: 4 MMOL/L (ref 3.5–5.1)
PROT SERPL-MCNC: 6.6 G/DL (ref 6.4–8.2)
RBC # BLD AUTO: 4.91 M/UL (ref 3.8–5.2)
SODIUM SERPL-SCNC: 142 MMOL/L (ref 136–145)
TRIGL SERPL-MCNC: 312 MG/DL (ref ?–150)
TSH SERPL DL<=0.05 MIU/L-ACNC: 2.51 UIU/ML (ref 0.36–3.74)
VLDLC SERPL CALC-MCNC: 62.4 MG/DL
WBC # BLD AUTO: 6.7 K/UL (ref 3.6–11)

## 2023-02-13 ENCOUNTER — OFFICE VISIT (OUTPATIENT)
Dept: FAMILY MEDICINE CLINIC | Age: 28
End: 2023-02-13
Payer: COMMERCIAL

## 2023-02-13 VITALS
BODY MASS INDEX: 47.48 KG/M2 | WEIGHT: 258 LBS | HEART RATE: 82 BPM | TEMPERATURE: 97.9 F | RESPIRATION RATE: 18 BRPM | SYSTOLIC BLOOD PRESSURE: 113 MMHG | OXYGEN SATURATION: 95 % | HEIGHT: 62 IN | DIASTOLIC BLOOD PRESSURE: 82 MMHG

## 2023-02-13 DIAGNOSIS — F41.9 ANXIETY: Primary | ICD-10-CM

## 2023-02-13 DIAGNOSIS — E78.01 FAMILIAL HYPERCHOLESTEROLEMIA: ICD-10-CM

## 2023-02-13 PROCEDURE — 99213 OFFICE O/P EST LOW 20 MIN: CPT | Performed by: NURSE PRACTITIONER

## 2023-02-13 NOTE — PROGRESS NOTES
HISTORY OF PRESENT ILLNESS  Naveen Lutz is a 28 y.o. female. HPI  Patient is here for follow up anxiety  Started lexapro 10mg at last visit  Doing much better, anxiety is much better, has not needed to take the hydroxyzine    Choleserol extremely high last visit  Has done diet changes with no meat or fast food x 1 mo  No fh of CAD but really not sure    ROS  A comprehensive review of system was obtained and negative except findings in the HPI    Visit Vitals  /82 (BP 1 Location: Right arm, BP Patient Position: Sitting)   Pulse 82   Temp 97.9 °F (36.6 °C) (Oral)   Resp 18   Ht 5' 2\" (1.575 m)   Wt 258 lb (117 kg)   LMP 01/16/2023 (Approximate)   SpO2 95%   BMI 47.19 kg/m²     Physical Exam  Vitals and nursing note reviewed. Constitutional:       Appearance: Normal appearance. She is well-developed. Comments:      Neck:      Vascular: No JVD. Cardiovascular:      Rate and Rhythm: Normal rate and regular rhythm. Heart sounds: Normal heart sounds. No murmur heard. No friction rub. No gallop. Pulmonary:      Effort: Pulmonary effort is normal. No respiratory distress. Breath sounds: Normal breath sounds. No wheezing. Skin:     General: Skin is warm. Neurological:      Mental Status: She is alert and oriented to person, place, and time. ASSESSMENT and PLAN  Encounter Diagnoses   Name Primary? Anxiety Yes    Familial hypercholesterolemia      No changes in meds at this time  Follow-up and Dispositions    Return in about 6 months (around 8/13/2023) for med check and fasting cholesterol. I have discussed the diagnosis with the patient and the intended plan as seen in the above orders. The patient has received an after-visit summary and questions were answered concerning future plans. Patient conveyed understanding of the plan at the time of the visit. Jasper Alfaro, MSN, ANP  2/13/2023    No orders of the defined types were placed in this encounter.

## 2023-02-13 NOTE — PROGRESS NOTES
Chief Complaint   Patient presents with    Follow-up    Medication Evaluation     Pt being seen for fuv  -med eval  Pt states that her med is doing well     1. Have you been to the ER, urgent care clinic since your last visit? Hospitalized since your last visit? No    2. Have you seen or consulted any other health care providers outside of the [address] [place] [address] since your last visit? Include any pap smears or colon screening.  No    Pt has no other concerns

## 2023-05-10 ENCOUNTER — TELEPHONE (OUTPATIENT)
Age: 28
End: 2023-05-10

## 2023-07-05 ENCOUNTER — TELEPHONE (OUTPATIENT)
Age: 28
End: 2023-07-05

## 2023-07-05 NOTE — TELEPHONE ENCOUNTER
Patient states that she still takes hctz but she is having isaiah leg swelling with lt leg the worst for a week.  Please advise

## 2023-07-10 ENCOUNTER — TELEPHONE (OUTPATIENT)
Age: 28
End: 2023-07-10

## 2023-07-10 NOTE — TELEPHONE ENCOUNTER
If her bp has been good on the original order then just keep it there, no need to double it.  [address]

## 2023-07-10 NOTE — TELEPHONE ENCOUNTER
Called and spoke with patient. Pt id x 2. Patient states Raghavendra Fink told her to double up on the Hydrochlorothiazide rx to help with the leg swelling. She hasn't doubled up on the rx yet because she forgot to let Raghavendra Fink know that her blood pressure has been good, running 120/70. She would like to know if Raghavendra Fink still wants her to double up on the rx? States she just wanted to check to make sure because she doesn't want her blood pressure to drop lower by doing so. Informed her will inform the provider. Pt verbalized understanding.

## 2023-07-11 NOTE — TELEPHONE ENCOUNTER
Called and spoke with patient. Pt id x 2. Relayed the previous message per Alessandra Cabrera NP. Pt verbalized understanding.

## 2023-07-11 NOTE — TELEPHONE ENCOUNTER
We will need to send her to see vascular when she comes in do a doppler of the legs. She can keep them elevated, compression knee highs when at home, push water, decrease salt.  [place]

## 2023-07-11 NOTE — TELEPHONE ENCOUNTER
Called and spoke with patient. Pt id x 2. Relayed the previous message per Nithin Giraldo NP. Pt verbalized understanding. Patient would like to know what Yue Greenson suggests she do for the bilateral leg swelling now that she's told her to stay on the original rx since her bp has been good. States she has an upcoming appt on 8/14/23 but would like to know what she should do until then.

## 2023-07-18 RX ORDER — ESCITALOPRAM OXALATE 10 MG/1
TABLET ORAL
Qty: 30 TABLET | Refills: 0 | Status: SHIPPED | OUTPATIENT
Start: 2023-07-18

## 2023-08-11 SDOH — ECONOMIC STABILITY: INCOME INSECURITY: HOW HARD IS IT FOR YOU TO PAY FOR THE VERY BASICS LIKE FOOD, HOUSING, MEDICAL CARE, AND HEATING?: VERY HARD

## 2023-08-11 SDOH — ECONOMIC STABILITY: FOOD INSECURITY: WITHIN THE PAST 12 MONTHS, YOU WORRIED THAT YOUR FOOD WOULD RUN OUT BEFORE YOU GOT MONEY TO BUY MORE.: OFTEN TRUE

## 2023-08-11 SDOH — ECONOMIC STABILITY: TRANSPORTATION INSECURITY
IN THE PAST 12 MONTHS, HAS LACK OF TRANSPORTATION KEPT YOU FROM MEETINGS, WORK, OR FROM GETTING THINGS NEEDED FOR DAILY LIVING?: YES

## 2023-08-11 SDOH — ECONOMIC STABILITY: FOOD INSECURITY: WITHIN THE PAST 12 MONTHS, THE FOOD YOU BOUGHT JUST DIDN'T LAST AND YOU DIDN'T HAVE MONEY TO GET MORE.: OFTEN TRUE

## 2023-08-11 SDOH — ECONOMIC STABILITY: HOUSING INSECURITY
IN THE LAST 12 MONTHS, WAS THERE A TIME WHEN YOU DID NOT HAVE A STEADY PLACE TO SLEEP OR SLEPT IN A SHELTER (INCLUDING NOW)?: NO

## 2023-08-14 ENCOUNTER — OFFICE VISIT (OUTPATIENT)
Age: 28
End: 2023-08-14
Payer: COMMERCIAL

## 2023-08-14 VITALS
WEIGHT: 274.4 LBS | SYSTOLIC BLOOD PRESSURE: 126 MMHG | RESPIRATION RATE: 20 BRPM | TEMPERATURE: 98.5 F | BODY MASS INDEX: 50.5 KG/M2 | DIASTOLIC BLOOD PRESSURE: 86 MMHG | HEIGHT: 62 IN | HEART RATE: 74 BPM | OXYGEN SATURATION: 95 %

## 2023-08-14 DIAGNOSIS — E78.01 FAMILIAL HYPERCHOLESTEROLEMIA: Primary | ICD-10-CM

## 2023-08-14 DIAGNOSIS — I10 ESSENTIAL (PRIMARY) HYPERTENSION: ICD-10-CM

## 2023-08-14 DIAGNOSIS — F17.200 SMOKING ADDICTION: ICD-10-CM

## 2023-08-14 PROCEDURE — 99214 OFFICE O/P EST MOD 30 MIN: CPT | Performed by: NURSE PRACTITIONER

## 2023-08-14 PROCEDURE — 3074F SYST BP LT 130 MM HG: CPT | Performed by: NURSE PRACTITIONER

## 2023-08-14 PROCEDURE — 3079F DIAST BP 80-89 MM HG: CPT | Performed by: NURSE PRACTITIONER

## 2023-08-14 RX ORDER — NICOTINE 21 MG/24HR
1 PATCH, TRANSDERMAL 24 HOURS TRANSDERMAL EVERY 24 HOURS
Qty: 30 PATCH | Refills: 3 | Status: SHIPPED | OUTPATIENT
Start: 2023-08-14 | End: 2024-08-13

## 2023-08-14 RX ORDER — ESCITALOPRAM OXALATE 10 MG/1
15 TABLET ORAL DAILY
Qty: 45 TABLET | Refills: 3 | Status: SHIPPED | OUTPATIENT
Start: 2023-08-14

## 2023-08-14 ASSESSMENT — PATIENT HEALTH QUESTIONNAIRE - PHQ9
1. LITTLE INTEREST OR PLEASURE IN DOING THINGS: 0
2. FEELING DOWN, DEPRESSED OR HOPELESS: 0
SUM OF ALL RESPONSES TO PHQ QUESTIONS 1-9: 0
SUM OF ALL RESPONSES TO PHQ9 QUESTIONS 1 & 2: 0
SUM OF ALL RESPONSES TO PHQ QUESTIONS 1-9: 0

## 2023-08-14 ASSESSMENT — ANXIETY QUESTIONNAIRES
4. TROUBLE RELAXING: 1
GAD7 TOTAL SCORE: 7
7. FEELING AFRAID AS IF SOMETHING AWFUL MIGHT HAPPEN: 1
2. NOT BEING ABLE TO STOP OR CONTROL WORRYING: 1
1. FEELING NERVOUS, ANXIOUS, OR ON EDGE: 1
5. BEING SO RESTLESS THAT IT IS HARD TO SIT STILL: 1
IF YOU CHECKED OFF ANY PROBLEMS ON THIS QUESTIONNAIRE, HOW DIFFICULT HAVE THESE PROBLEMS MADE IT FOR YOU TO DO YOUR WORK, TAKE CARE OF THINGS AT HOME, OR GET ALONG WITH OTHER PEOPLE: VERY DIFFICULT
6. BECOMING EASILY ANNOYED OR IRRITABLE: 1
3. WORRYING TOO MUCH ABOUT DIFFERENT THINGS: 1

## 2023-08-14 NOTE — PROGRESS NOTES
Anxiety 7. Mauricio Santizo is a 33 y.o. female , id x 2(name and ). Reviewed record, history, and  medications. Chief Complaint   Patient presents with    Medication Check     Patient here for medication check and cholesterol check. Vitals:    23 1541   Weight: 274 lb 6.4 oz (124.5 kg)   Height: 5' 2\" (1.575 m)       Coordination of Care Questionnaire:   1. Have you been to the ER, urgent care clinic since your last visit? Hospitalized since your last visit? No    2. Have you seen or consulted any other health care providers outside of the [address] [place] [address] since your last visit? Include any pap smears or colon screening. No      PHQ-9  2023   Little interest or pleasure in doing things 0   Feeling down, depressed, or hopeless 0   PHQ-2 Score 0   PHQ-9 Total Score 0       CHRIS-7 SCREENING 2023   Feeling nervous, anxious, or on edge Several days   Not being able to stop or control worrying Several days   Worrying too much about different things Several days   Trouble relaxing Several days   Being so restless that it is hard to sit still Several days   Becoming easily annoyed or irritable Several days   Feeling afraid as if something awful might happen Several days   CHRIS-7 Total Score 7   How difficult have these problems made it for you to do your work, take care of things at home, or get along with other people? Very difficult       Social Determinants of Health     Tobacco Use: High Risk    Smoking Tobacco Use: Every Day    Smokeless Tobacco Use: Never    Passive Exposure: Not on file   Alcohol Use: Not on file   Financial Resource Strain: High Risk    Difficulty of Paying Living Expenses: Very hard   Food Insecurity: Food Insecurity Present    Worried About [place] in the Last Year: Often true    Ran Out of Food in the Last Year: Often true   Transportation Needs: Unmet Transportation Needs    Lack of Transportation (Medical):  Not on file    Lack of Transportation

## 2023-08-15 LAB
ALBUMIN SERPL-MCNC: 3.1 G/DL (ref 3.5–5)
ALBUMIN/GLOB SERPL: 0.9 (ref 1.1–2.2)
ALP SERPL-CCNC: 75 U/L (ref 45–117)
ALT SERPL-CCNC: 31 U/L (ref 12–78)
ANION GAP SERPL CALC-SCNC: 4 MMOL/L (ref 5–15)
AST SERPL-CCNC: 22 U/L (ref 15–37)
BILIRUB SERPL-MCNC: 0.3 MG/DL (ref 0.2–1)
BUN SERPL-MCNC: 9 MG/DL (ref 6–20)
BUN/CREAT SERPL: 15 (ref 12–20)
CALCIUM SERPL-MCNC: 9 MG/DL (ref 8.5–10.1)
CHLORIDE SERPL-SCNC: 107 MMOL/L (ref 97–108)
CHOLEST SERPL-MCNC: 314 MG/DL
CO2 SERPL-SCNC: 26 MMOL/L (ref 21–32)
CREAT SERPL-MCNC: 0.6 MG/DL (ref 0.55–1.02)
GLOBULIN SER CALC-MCNC: 3.3 G/DL (ref 2–4)
GLUCOSE SERPL-MCNC: 93 MG/DL (ref 65–100)
HDLC SERPL-MCNC: 43 MG/DL
HDLC SERPL: 7.3 (ref 0–5)
LDLC SERPL CALC-MCNC: 234 MG/DL (ref 0–100)
POTASSIUM SERPL-SCNC: 3.9 MMOL/L (ref 3.5–5.1)
PROT SERPL-MCNC: 6.4 G/DL (ref 6.4–8.2)
SODIUM SERPL-SCNC: 137 MMOL/L (ref 136–145)
TRIGL SERPL-MCNC: 185 MG/DL
VLDLC SERPL CALC-MCNC: 37 MG/DL

## 2023-08-31 ENCOUNTER — TELEPHONE (OUTPATIENT)
Age: 28
End: 2023-08-31

## 2023-08-31 RX ORDER — ROSUVASTATIN CALCIUM 10 MG/1
10 TABLET, COATED ORAL DAILY
Qty: 30 TABLET | Refills: 5 | Status: SHIPPED | OUTPATIENT
Start: 2023-08-31

## 2023-08-31 RX ORDER — ESCITALOPRAM OXALATE 10 MG/1
15 TABLET ORAL DAILY
Qty: 45 TABLET | Refills: 3 | Status: SHIPPED | OUTPATIENT
Start: 2023-08-31

## 2023-08-31 RX ORDER — PANTOPRAZOLE SODIUM 40 MG/1
40 TABLET, DELAYED RELEASE ORAL DAILY
Qty: 30 TABLET | Refills: 5 | Status: SHIPPED | OUTPATIENT
Start: 2023-08-31

## 2023-08-31 RX ORDER — HYDROCHLOROTHIAZIDE 12.5 MG/1
12.5 TABLET ORAL DAILY
Qty: 30 TABLET | Refills: 5 | Status: SHIPPED | OUTPATIENT
Start: 2023-08-31

## 2023-08-31 NOTE — TELEPHONE ENCOUNTER
Pt called in and states the insurance needs a PA on the new rx of escitalopram (LEXAPRO) 10 MG tablet. Before PA is done she is asking for meds to be switched to [facility] in [facility], did let her know it would be a permanent change, verbalized understanding.

## 2023-09-28 RX ORDER — PANTOPRAZOLE SODIUM 40 MG/1
40 TABLET, DELAYED RELEASE ORAL DAILY
Qty: 30 TABLET | Refills: 5 | Status: SHIPPED | OUTPATIENT
Start: 2023-09-28

## 2023-09-28 RX ORDER — HYDROCHLOROTHIAZIDE 12.5 MG/1
12.5 TABLET ORAL DAILY
Qty: 30 TABLET | Refills: 5 | Status: SHIPPED | OUTPATIENT
Start: 2023-09-28

## 2023-11-15 ENCOUNTER — TELEMEDICINE (OUTPATIENT)
Age: 28
End: 2023-11-15
Payer: COMMERCIAL

## 2023-11-15 DIAGNOSIS — F41.9 ANXIETY DISORDER, UNSPECIFIED TYPE: Primary | ICD-10-CM

## 2023-11-15 PROCEDURE — 99214 OFFICE O/P EST MOD 30 MIN: CPT | Performed by: NURSE PRACTITIONER

## 2023-11-15 RX ORDER — BUPROPION HYDROCHLORIDE 150 MG/1
150 TABLET ORAL EVERY MORNING
Qty: 30 TABLET | Refills: 3 | Status: SHIPPED | OUTPATIENT
Start: 2023-11-15

## 2023-11-15 RX ORDER — HYDROXYZINE HYDROCHLORIDE 25 MG/1
25 TABLET, FILM COATED ORAL 3 TIMES DAILY PRN
Qty: 60 TABLET | Refills: 1 | Status: SHIPPED | OUTPATIENT
Start: 2023-11-15

## 2023-11-15 NOTE — PROGRESS NOTES
Anjali Wilson, was evaluated through a synchronous (real-time) audio-video encounter. The patient (or guardian if applicable) is aware that this is a billable service, which includes applicable co-pays. This Virtual Visit was conducted with patient's (and/or legal guardian's) consent. Patient identification was verified, and a caregiver was present when appropriate. The patient was located at Home: [address]  Provider was located at Home ([address] [facility]): [address] [facility] (:  1995) is a Established patient, presenting virtually for evaluation of the following:    Diagnoses and all orders for this visit:    Anxiety disorder, unspecified type    Other orders  -     hydrOXYzine HCl (ATARAX) 25 MG tablet; Take 1 tablet by mouth 3 times daily as needed for Anxiety  -     buPROPion (WELLBUTRIN XL) 150 MG extended release tablet; Take 1 tablet by mouth every morning      Added wellbutrin xl 150mg a day  Refilled atarax as needed  Cont Lexapro  Will do note/form for FMLA from Nov 15 -  for a 2 week leave of absence.    Follow up  for labs and med check    Subjective   HPI  She is having increase in anxiety  On lexapro but still having bad panic attacks  Family and work stress  Would like to take time off from [employer]  Already made appt with [facility] for counseling at intake    Review of Systems   A comprehensive review of system was obtained and negative except findings in the HPI      Objective   Patient-Reported Vitals  No data recorded     Physical Exam  [INSTRUCTIONS:  \"[x]\" Indicates a positive item  \"[]\" Indicates a negative item  -- DELETE ALL ITEMS NOT EXAMINED]    Constitutional: [x] Appears well-developed and well-nourished [x] No apparent distress      [] Abnormal -     Mental status: [x] Alert and awake  [x] Oriented to person/place/time [x] Able to follow commands    [] Abnormal -     Eyes:   EOM    [x]  Normal    [] Abnormal -   Sclera  [x]

## 2024-01-01 RX ORDER — ALBUTEROL SULFATE 90 UG/1
2 AEROSOL, METERED RESPIRATORY (INHALATION) EVERY 4 HOURS PRN
Qty: 18 G | Refills: 2 | Status: SHIPPED | OUTPATIENT
Start: 2024-01-01

## 2024-02-05 ENCOUNTER — OFFICE VISIT (OUTPATIENT)
Age: 29
End: 2024-02-05
Payer: COMMERCIAL

## 2024-02-05 VITALS
OXYGEN SATURATION: 98 % | BODY MASS INDEX: 40.12 KG/M2 | TEMPERATURE: 98.5 F | WEIGHT: 218 LBS | DIASTOLIC BLOOD PRESSURE: 82 MMHG | SYSTOLIC BLOOD PRESSURE: 131 MMHG | HEIGHT: 62 IN | HEART RATE: 82 BPM | RESPIRATION RATE: 16 BRPM

## 2024-02-05 DIAGNOSIS — B35.4 TINEA CORPORIS: ICD-10-CM

## 2024-02-05 DIAGNOSIS — F41.9 ANXIETY DISORDER, UNSPECIFIED TYPE: ICD-10-CM

## 2024-02-05 DIAGNOSIS — E78.01 FAMILIAL HYPERCHOLESTEROLEMIA: ICD-10-CM

## 2024-02-05 DIAGNOSIS — E78.01 FAMILIAL HYPERCHOLESTEROLEMIA: Primary | ICD-10-CM

## 2024-02-05 PROCEDURE — 99214 OFFICE O/P EST MOD 30 MIN: CPT | Performed by: NURSE PRACTITIONER

## 2024-02-05 RX ORDER — FLUCONAZOLE 150 MG/1
150 TABLET ORAL ONCE
Qty: 2 TABLET | Refills: 0 | Status: SHIPPED | OUTPATIENT
Start: 2024-02-05 | End: 2024-02-05

## 2024-02-05 RX ORDER — ESCITALOPRAM OXALATE 10 MG/1
10 TABLET ORAL DAILY
Qty: 30 TABLET | Refills: 3 | Status: SHIPPED | COMMUNITY
Start: 2024-02-05

## 2024-02-05 RX ORDER — ARIPIPRAZOLE 5 MG/1
5 TABLET ORAL DAILY
Qty: 30 TABLET | Refills: 3 | Status: SHIPPED | OUTPATIENT
Start: 2024-02-05

## 2024-02-05 ASSESSMENT — PATIENT HEALTH QUESTIONNAIRE - PHQ9
SUM OF ALL RESPONSES TO PHQ QUESTIONS 1-9: 2
2. FEELING DOWN, DEPRESSED OR HOPELESS: 1
SUM OF ALL RESPONSES TO PHQ QUESTIONS 1-9: 2
SUM OF ALL RESPONSES TO PHQ9 QUESTIONS 1 & 2: 2
SUM OF ALL RESPONSES TO PHQ QUESTIONS 1-9: 2
SUM OF ALL RESPONSES TO PHQ QUESTIONS 1-9: 2
1. LITTLE INTEREST OR PLEASURE IN DOING THINGS: 1

## 2024-02-05 NOTE — PROGRESS NOTES
Chief Complaint   Patient presents with    Cholesterol Problem   Patient is in office today for Cholesterol.  Patient wants cholesterol check.  Patient wants to talk about Wellbutrin effects.  No other concerns.        1. Have you been to the ER, urgent care clinic since your last visit?  Hospitalized since your last visit?No    2. Have you seen or consulted any other health care providers outside of the [facility] since your last visit?  Include any pap smears or colon screening. No

## 2024-02-06 LAB
ALT SERPL-CCNC: 22 U/L (ref 12–78)
AST SERPL-CCNC: 15 U/L (ref 15–37)
CHOLEST SERPL-MCNC: 216 MG/DL
HDLC SERPL-MCNC: 42 MG/DL
HDLC SERPL: 5.1 (ref 0–5)
LDLC SERPL CALC-MCNC: 134.4 MG/DL (ref 0–100)
VLDLC SERPL CALC-MCNC: 39.6 MG/DL

## 2024-02-08 NOTE — PROGRESS NOTES
Subjective:      Patient ID: Paula Araujo is a 28 y.o. female.    HPI  Patient is in office today for Cholesterol.  Patient wants cholesterol check - started crestor 10mg last visit  No adr/se of med noted  Patient wants to talk about Wellbutrin effects.  Started wellbutrin and notes severe side effects of worsening anxiety  Still using hydroxyzine but not helping with acute sx  Still also on lexapro    Review of Systems  A comprehensive review of system was obtained and negative except findings in the HPI    /82 (Site: Left Upper Arm, Position: Sitting)   Pulse 82   Temp 98.5 °F (36.9 °C) (Oral)   Resp 16   Ht 1.575 m (5' 2\")   Wt 98.9 kg (218 lb)   LMP  (LMP Unknown)   SpO2 98%   BMI 39.87 kg/m²   Objective:   Physical Exam  Vitals and nursing note reviewed.   Constitutional:       General: She is not in acute distress.     Appearance: Normal appearance.   Cardiovascular:      Rate and Rhythm: Normal rate and regular rhythm.   Pulmonary:      Effort: Pulmonary effort is normal. No respiratory distress.      Breath sounds: Normal breath sounds. No wheezing or rhonchi.   Musculoskeletal:         General: No swelling.   Neurological:      General: No focal deficit present.      Mental Status: She is alert and oriented to person, place, and time.   Psychiatric:         Mood and Affect: Mood normal.         Assessment / Plan:   Paula was seen today for cholesterol problem.    Diagnoses and all orders for this visit:    Familial hypercholesterolemia  -     Lipid Panel; Future  -     ALT; Future  -     AST; Future    Anxiety and Depression  External Yeast Infection    -     ARIPiprazole (ABILIFY) 5 MG tablet; Take 1 tablet by mouth daily - 7pm  -     nystatin-triamcinolone (MYCOLOG II) 212601-6.1 UNIT/GM-% cream; Apply topically 2 times daily as needed for skin yeast infection  -     fluconazole (DIFLUCAN) 150 MG tablet; Take 1 tablet by mouth once for 1 dose -repeat 4 days      Labs updated for

## 2024-02-13 RX ORDER — HYDROXYZINE HYDROCHLORIDE 25 MG/1
25 TABLET, FILM COATED ORAL 3 TIMES DAILY PRN
Qty: 60 TABLET | Refills: 1 | Status: SHIPPED | OUTPATIENT
Start: 2024-02-13

## 2024-02-13 RX ORDER — HYDROCHLOROTHIAZIDE 12.5 MG/1
12.5 TABLET ORAL DAILY
Qty: 30 TABLET | Refills: 5 | Status: SHIPPED | OUTPATIENT
Start: 2024-02-13

## 2024-02-13 RX ORDER — ESCITALOPRAM OXALATE 10 MG/1
10 TABLET ORAL DAILY
Qty: 30 TABLET | Refills: 3 | Status: SHIPPED | OUTPATIENT
Start: 2024-02-13

## 2024-02-13 RX ORDER — ROSUVASTATIN CALCIUM 10 MG/1
10 TABLET, COATED ORAL DAILY
Qty: 30 TABLET | Refills: 5 | Status: SHIPPED | OUTPATIENT
Start: 2024-02-13

## 2024-02-13 RX ORDER — PANTOPRAZOLE SODIUM 40 MG/1
40 TABLET, DELAYED RELEASE ORAL DAILY
Qty: 30 TABLET | Refills: 5 | Status: SHIPPED | OUTPATIENT
Start: 2024-02-13

## 2024-04-19 RX ORDER — BUPROPION HYDROCHLORIDE 150 MG/1
150 TABLET ORAL EVERY MORNING
Qty: 30 TABLET | Refills: 3 | Status: SHIPPED | OUTPATIENT
Start: 2024-04-19

## 2024-04-26 ENCOUNTER — TELEPHONE (OUTPATIENT)
Age: 29
End: 2024-04-26

## 2024-04-26 NOTE — TELEPHONE ENCOUNTER
Called pt in regards to her mychart apt request for Hip, thigh and back pain, home number is no longer working & lvm on mobile number to call us back.

## 2024-05-02 RX ORDER — ALBUTEROL SULFATE 90 UG/1
2 AEROSOL, METERED RESPIRATORY (INHALATION) EVERY 4 HOURS PRN
Qty: 18 G | Refills: 2 | Status: SHIPPED | OUTPATIENT
Start: 2024-05-02

## 2024-05-08 ENCOUNTER — TELEMEDICINE (OUTPATIENT)
Age: 29
End: 2024-05-08
Payer: COMMERCIAL

## 2024-05-08 DIAGNOSIS — F41.9 ANXIETY DISORDER, UNSPECIFIED TYPE: ICD-10-CM

## 2024-05-08 DIAGNOSIS — M25.511 ACUTE PAIN OF RIGHT SHOULDER: ICD-10-CM

## 2024-05-08 DIAGNOSIS — M79.10 MYALGIA: Primary | ICD-10-CM

## 2024-05-08 DIAGNOSIS — R06.83 SNORING: ICD-10-CM

## 2024-05-08 PROCEDURE — 99214 OFFICE O/P EST MOD 30 MIN: CPT | Performed by: NURSE PRACTITIONER

## 2024-05-08 RX ORDER — ESCITALOPRAM OXALATE 10 MG/1
15 TABLET ORAL DAILY
Qty: 45 TABLET | Refills: 3 | Status: SHIPPED | OUTPATIENT
Start: 2024-05-08
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
## 2024-05-08 NOTE — PROGRESS NOTES
Chief Complaint   Patient presents with    Muscle Pain    Anxiety    Depression     Patient is on virtual today for muscle pain, anxiety and depression.  Patient stated she has generalized pain and weakness that has been going on for a couple of weeks.  Patient stated she has been having burning in her chest for a couple of weeks.   Patient would like to discuss her mental health meds as well.  No other concerns.      Patient stated she is in the [facility] at the time of this virtual visit.        \"Have you been to the ER, urgent care clinic since your last visit?  Hospitalized since your last visit?\"    NO    “Have you seen or consulted any other health care providers outside of [facility] since your last visit?”    NO     “Have you had a pap smear?”    NO    No cervical cancer screening on file             Click Here for Release of Records Request

## 2024-06-02 DIAGNOSIS — F41.9 ANXIETY DISORDER, UNSPECIFIED TYPE: ICD-10-CM

## 2024-06-03 DIAGNOSIS — F41.9 ANXIETY DISORDER, UNSPECIFIED TYPE: ICD-10-CM

## 2024-06-03 RX ORDER — ESCITALOPRAM OXALATE 10 MG/1
15 TABLET ORAL DAILY
Qty: 45 TABLET | Refills: 3 | Status: SHIPPED | OUTPATIENT
Start: 2024-06-03 | End: 2024-06-03

## 2024-06-03 RX ORDER — ALBUTEROL SULFATE 90 UG/1
2 AEROSOL, METERED RESPIRATORY (INHALATION) EVERY 4 HOURS PRN
Qty: 18 G | Refills: 2 | Status: SHIPPED | OUTPATIENT
Start: 2024-06-03

## 2024-06-03 RX ORDER — HYDROXYZINE HYDROCHLORIDE 25 MG/1
25 TABLET, FILM COATED ORAL 3 TIMES DAILY PRN
Qty: 60 TABLET | Refills: 1 | Status: SHIPPED | OUTPATIENT
Start: 2024-06-03

## 2024-06-03 RX ORDER — PANTOPRAZOLE SODIUM 40 MG/1
40 TABLET, DELAYED RELEASE ORAL DAILY
Qty: 30 TABLET | Refills: 5 | Status: SHIPPED | OUTPATIENT
Start: 2024-06-03

## 2024-06-03 RX ORDER — CITALOPRAM 20 MG/1
20 TABLET ORAL DAILY
Qty: 30 TABLET | Refills: 1 | Status: SHIPPED | OUTPATIENT
Start: 2024-06-03

## 2024-06-03 RX ORDER — HYDROCHLOROTHIAZIDE 12.5 MG/1
12.5 TABLET ORAL DAILY
Qty: 30 TABLET | Refills: 5 | Status: SHIPPED | OUTPATIENT
Start: 2024-06-03

## 2024-06-04 RX ORDER — FLUCONAZOLE 150 MG/1
150 TABLET ORAL ONCE
Qty: 2 TABLET | Refills: 0 | Status: SHIPPED | OUTPATIENT
Start: 2024-06-04 | End: 2024-06-04

## 2024-06-06 ENCOUNTER — TELEPHONE (OUTPATIENT)
Age: 29
End: 2024-06-06

## 2024-06-06 DIAGNOSIS — F41.9 ANXIETY DISORDER, UNSPECIFIED TYPE: ICD-10-CM

## 2024-06-06 RX ORDER — ESCITALOPRAM OXALATE 10 MG/1
10 TABLET ORAL DAILY
COMMUNITY
End: 2024-06-06 | Stop reason: SDUPTHER

## 2024-06-06 RX ORDER — CITALOPRAM 20 MG/1
TABLET ORAL
Refills: 0 | OUTPATIENT
Start: 2024-06-06

## 2024-06-06 RX ORDER — ESCITALOPRAM OXALATE 10 MG/1
10 TABLET ORAL DAILY
Qty: 45 TABLET | Refills: 1 | Status: SHIPPED | OUTPATIENT
Start: 2024-06-06

## 2024-06-06 RX ORDER — CITALOPRAM 20 MG/1
20 TABLET ORAL DAILY
Qty: 30 TABLET | Refills: 1 | OUTPATIENT
Start: 2024-06-06

## 2024-06-21 RX ORDER — ESCITALOPRAM OXALATE 10 MG/1
10 TABLET ORAL DAILY
Qty: 45 TABLET | Refills: 1 | Status: SHIPPED | OUTPATIENT
Start: 2024-06-21

## 2024-06-28 RX ORDER — ARIPIPRAZOLE 5 MG/1
TABLET ORAL
Qty: 30 TABLET | Refills: 3 | OUTPATIENT
Start: 2024-06-28

## 2024-07-10 ENCOUNTER — CLINICAL DOCUMENTATION (OUTPATIENT)
Age: 29
End: 2024-07-10

## 2024-07-10 NOTE — PROGRESS NOTES
PA for lexapro 45/30 days & pantoprazole was approved from 6/18/24- 6/18/25, copy faxed to pt pharmacy.

## 2024-07-14 RX ORDER — PANTOPRAZOLE SODIUM 40 MG/1
40 TABLET, DELAYED RELEASE ORAL DAILY
Qty: 30 TABLET | Refills: 5 | Status: SHIPPED | OUTPATIENT
Start: 2024-07-14

## 2024-08-05 ENCOUNTER — CLINICAL DOCUMENTATION (OUTPATIENT)
Age: 29
End: 2024-08-05

## 2024-08-05 NOTE — PROGRESS NOTES
Left Voice Message for patient to call the office to schedule a Consultation for Sleep Apnea. Sent patient a letter as well due to not being able to reach them by phone and removing from referral que.

## 2024-08-20 RX ORDER — HYDROXYZINE HYDROCHLORIDE 25 MG/1
25 TABLET, FILM COATED ORAL 3 TIMES DAILY PRN
Qty: 60 TABLET | Refills: 1 | Status: SHIPPED | OUTPATIENT
Start: 2024-08-20

## 2024-10-06 RX ORDER — ALBUTEROL SULFATE 90 UG/1
2 INHALANT RESPIRATORY (INHALATION) EVERY 4 HOURS PRN
Qty: 18 EACH | Refills: 2 | Status: SHIPPED | OUTPATIENT
Start: 2024-10-06

## 2025-02-26 RX ORDER — HYDROXYZINE HYDROCHLORIDE 25 MG/1
25 TABLET, FILM COATED ORAL 3 TIMES DAILY PRN
Qty: 60 TABLET | Refills: 1 | Status: SHIPPED | OUTPATIENT
Start: 2025-02-26

## 2025-03-10 SDOH — ECONOMIC STABILITY: TRANSPORTATION INSECURITY
IN THE PAST 12 MONTHS, HAS LACK OF TRANSPORTATION KEPT YOU FROM MEETINGS, WORK, OR FROM GETTING THINGS NEEDED FOR DAILY LIVING?: PATIENT DECLINED

## 2025-03-10 SDOH — ECONOMIC STABILITY: INCOME INSECURITY: IN THE LAST 12 MONTHS, WAS THERE A TIME WHEN YOU WERE NOT ABLE TO PAY THE MORTGAGE OR RENT ON TIME?: PATIENT DECLINED

## 2025-03-10 SDOH — ECONOMIC STABILITY: FOOD INSECURITY: WITHIN THE PAST 12 MONTHS, YOU WORRIED THAT YOUR FOOD WOULD RUN OUT BEFORE YOU GOT MONEY TO BUY MORE.: PATIENT DECLINED

## 2025-03-10 SDOH — ECONOMIC STABILITY: TRANSPORTATION INSECURITY
IN THE PAST 12 MONTHS, HAS THE LACK OF TRANSPORTATION KEPT YOU FROM MEDICAL APPOINTMENTS OR FROM GETTING MEDICATIONS?: PATIENT DECLINED

## 2025-03-10 SDOH — ECONOMIC STABILITY: FOOD INSECURITY: WITHIN THE PAST 12 MONTHS, THE FOOD YOU BOUGHT JUST DIDN'T LAST AND YOU DIDN'T HAVE MONEY TO GET MORE.: PATIENT DECLINED

## 2025-03-12 ENCOUNTER — TELEMEDICINE (OUTPATIENT)
Facility: CLINIC | Age: 30
End: 2025-03-12
Payer: COMMERCIAL

## 2025-03-12 DIAGNOSIS — E78.01 FAMILIAL HYPERCHOLESTEROLEMIA: Primary | ICD-10-CM

## 2025-03-12 DIAGNOSIS — I10 ESSENTIAL (PRIMARY) HYPERTENSION: ICD-10-CM

## 2025-03-12 DIAGNOSIS — E28.2 PCOS (POLYCYSTIC OVARIAN SYNDROME): ICD-10-CM

## 2025-03-12 PROCEDURE — 99214 OFFICE O/P EST MOD 30 MIN: CPT | Performed by: NURSE PRACTITIONER

## 2025-03-12 RX ORDER — SPIRONOLACTONE 25 MG/1
25 TABLET ORAL DAILY
Qty: 30 TABLET | Refills: 5 | Status: SHIPPED | OUTPATIENT
Start: 2025-03-12

## 2025-03-12 RX ORDER — ATORVASTATIN CALCIUM 10 MG/1
10 TABLET, FILM COATED ORAL DAILY
Qty: 30 TABLET | Refills: 5 | Status: SHIPPED | OUTPATIENT
Start: 2025-03-12

## 2025-03-12 ASSESSMENT — PATIENT HEALTH QUESTIONNAIRE - PHQ9
2. FEELING DOWN, DEPRESSED OR HOPELESS: NEARLY EVERY DAY
1. LITTLE INTEREST OR PLEASURE IN DOING THINGS: NEARLY EVERY DAY
3. TROUBLE FALLING OR STAYING ASLEEP: NEARLY EVERY DAY
7. TROUBLE CONCENTRATING ON THINGS, SUCH AS READING THE NEWSPAPER OR WATCHING TELEVISION: SEVERAL DAYS
8. MOVING OR SPEAKING SO SLOWLY THAT OTHER PEOPLE COULD HAVE NOTICED. OR THE OPPOSITE, BEING SO FIGETY OR RESTLESS THAT YOU HAVE BEEN MOVING AROUND A LOT MORE THAN USUAL: SEVERAL DAYS
6. FEELING BAD ABOUT YOURSELF - OR THAT YOU ARE A FAILURE OR HAVE LET YOURSELF OR YOUR FAMILY DOWN: MORE THAN HALF THE DAYS
9. THOUGHTS THAT YOU WOULD BE BETTER OFF DEAD, OR OF HURTING YOURSELF: NOT AT ALL
SUM OF ALL RESPONSES TO PHQ QUESTIONS 1-9: 17
5. POOR APPETITE OR OVEREATING: SEVERAL DAYS
10. IF YOU CHECKED OFF ANY PROBLEMS, HOW DIFFICULT HAVE THESE PROBLEMS MADE IT FOR YOU TO DO YOUR WORK, TAKE CARE OF THINGS AT HOME, OR GET ALONG WITH OTHER PEOPLE: EXTREMELY DIFFICULT
4. FEELING TIRED OR HAVING LITTLE ENERGY: NEARLY EVERY DAY

## 2025-03-12 NOTE — PROGRESS NOTES
Chief Complaint   Patient presents with    Follow-up Chronic Condition     \"Have you been to the ER, urgent care clinic since your last visit?  Hospitalized since your last visit?\"    NO    “Have you seen or consulted any other health care providers outside of [facility] since your last visit?”    NO     No data recorded    PHQ-9 Total Score: 17 (3/12/2025  4:03 PM)  Thoughts that you would be better off dead, or of hurting yourself in some way: 0 (3/12/2025  4:03 PM)       [facility] Vitals Questionnaire       Question 3/10/2025 11:23 PM EDT - Filed by Patient    What is your height? 5'3\"    What is your weight in pounds? 350    What is your top number blood pressure reading?       What is your bottom number blood pressure reading?       What is your pulse?       What is your temperature in Fahrenheit?        If you have a pulse oximeter, enter the reading here:       Patients with chronic lung disease who have a Peak Flow meter, please enter the reading here:       If you are having periods, please enter the date of your last menstrual period here: start 03/08/25          [facility] [employer] Of [employer] (Sdoh) Screening Questionnaire       Question 3/10/2025 11:24 PM EDT - Filed by Patient    Within the past 12 months, you worried that your food would run out before you got the money to buy more. Patient declined    Within the past 12 months, the food you bought just didn't last and you didn't have money to get more. Patient declined    In the past 12 months, has lack of transportation kept you from medical appointments or from getting medications? Patient declined    In the past 12 months, has lack of transportation kept you from meetings, work, or from getting things needed for daily living? Patient declined    In the last 12 months, was there a time when you were not able to pay the mortgage or rent on time? Patient declined    In the past 12 months, how many times have you moved where you

## 2025-04-09 RX ORDER — ESCITALOPRAM OXALATE 10 MG/1
TABLET ORAL
Qty: 45 TABLET | Refills: 1 | Status: SHIPPED | OUTPATIENT
Start: 2025-04-09

## 2025-06-19 RX ORDER — PANTOPRAZOLE SODIUM 40 MG/1
40 TABLET, DELAYED RELEASE ORAL DAILY
Qty: 30 TABLET | Refills: 5 | Status: SHIPPED | OUTPATIENT
Start: 2025-06-19

## 2025-06-19 RX ORDER — HYDROXYZINE HYDROCHLORIDE 25 MG/1
25 TABLET, FILM COATED ORAL 3 TIMES DAILY PRN
Qty: 60 TABLET | Refills: 1 | Status: SHIPPED | OUTPATIENT
Start: 2025-06-19

## 2025-06-19 RX ORDER — ESCITALOPRAM OXALATE 10 MG/1
15 TABLET ORAL DAILY
Qty: 45 TABLET | Refills: 1 | Status: SHIPPED | OUTPATIENT
Start: 2025-06-19

## 2025-06-19 RX ORDER — ALBUTEROL SULFATE 90 UG/1
2 INHALANT RESPIRATORY (INHALATION) EVERY 4 HOURS PRN
Qty: 18 EACH | Refills: 2 | Status: SHIPPED | OUTPATIENT
Start: 2025-06-19

## 2025-07-17 RX ORDER — FLUCONAZOLE 150 MG/1
150 TABLET ORAL ONCE
Qty: 1 TABLET | Refills: 0 | Status: SHIPPED | OUTPATIENT
Start: 2025-07-17 | End: 2025-07-17

## 2025-08-11 ENCOUNTER — TELEMEDICINE (OUTPATIENT)
Facility: CLINIC | Age: 30
End: 2025-08-11
Payer: COMMERCIAL

## 2025-08-11 DIAGNOSIS — R00.2 PALPITATIONS: Primary | ICD-10-CM

## 2025-08-11 DIAGNOSIS — R60.0 LOWER EXTREMITY EDEMA: ICD-10-CM

## 2025-08-11 DIAGNOSIS — F41.9 ANXIETY: ICD-10-CM

## 2025-08-11 PROBLEM — Z34.90 PREGNANCY: Status: RESOLVED | Noted: 2017-12-11 | Resolved: 2025-08-11

## 2025-08-11 PROBLEM — O14.90 PRE-ECLAMPSIA: Status: RESOLVED | Noted: 2017-12-19 | Resolved: 2025-08-11

## 2025-08-11 PROCEDURE — 99214 OFFICE O/P EST MOD 30 MIN: CPT

## 2025-08-11 RX ORDER — ESCITALOPRAM OXALATE 20 MG/1
20 TABLET ORAL DAILY
Qty: 90 TABLET | Refills: 1 | Status: SHIPPED | OUTPATIENT
Start: 2025-08-11

## 2025-08-11 RX ORDER — BUSPIRONE HYDROCHLORIDE 5 MG/1
5 TABLET ORAL 3 TIMES DAILY
Qty: 90 TABLET | Refills: 0 | Status: SHIPPED | OUTPATIENT
Start: 2025-08-11 | End: 2025-09-10

## 2025-08-11 RX ORDER — ALPRAZOLAM 0.5 MG
0.5 TABLET ORAL DAILY PRN
Qty: 30 TABLET | Refills: 0 | Status: SHIPPED | OUTPATIENT
Start: 2025-08-11 | End: 2025-09-10

## 2025-08-11 RX ORDER — HYDROCHLOROTHIAZIDE 12.5 MG/1
12.5 TABLET ORAL DAILY
Qty: 30 TABLET | Refills: 5 | Status: SHIPPED | OUTPATIENT
Start: 2025-08-11

## 2025-08-11 ASSESSMENT — ENCOUNTER SYMPTOMS
CHEST TIGHTNESS: 0
SHORTNESS OF BREATH: 0
ABDOMINAL PAIN: 0
WHEEZING: 0

## 2025-08-14 ENCOUNTER — LAB (OUTPATIENT)
Facility: CLINIC | Age: 30
End: 2025-08-14

## 2025-08-15 LAB
BASOPHILS # BLD AUTO: 0.1 X10E3/UL (ref 0–0.2)
BASOPHILS NFR BLD AUTO: 1 %
BUN SERPL-MCNC: 5 MG/DL (ref 6–20)
BUN/CREAT SERPL: 8 (ref 9–23)
CALCIUM SERPL-MCNC: 9.2 MG/DL (ref 8.7–10.2)
CHLORIDE SERPL-SCNC: 103 MMOL/L (ref 96–106)
CO2 SERPL-SCNC: 19 MMOL/L (ref 20–29)
CREAT SERPL-MCNC: 0.59 MG/DL (ref 0.57–1)
EGFRCR SERPLBLD CKD-EPI 2021: 123 ML/MIN/1.73
EOSINOPHIL # BLD AUTO: 0.1 X10E3/UL (ref 0–0.4)
EOSINOPHIL NFR BLD AUTO: 2 %
ERYTHROCYTE [DISTWIDTH] IN BLOOD BY AUTOMATED COUNT: 13.4 % (ref 11.7–15.4)
GLUCOSE SERPL-MCNC: 105 MG/DL (ref 70–99)
HCT VFR BLD AUTO: 47.4 % (ref 34–46.6)
HGB BLD-MCNC: 14.9 G/DL (ref 11.1–15.9)
IMM GRANULOCYTES # BLD AUTO: 0 X10E3/UL (ref 0–0.1)
IMM GRANULOCYTES NFR BLD AUTO: 0 %
LYMPHOCYTES # BLD AUTO: 3.2 X10E3/UL (ref 0.7–3.1)
LYMPHOCYTES NFR BLD AUTO: 39 %
MCH RBC QN AUTO: 29.2 PG (ref 26.6–33)
MCHC RBC AUTO-ENTMCNC: 31.4 G/DL (ref 31.5–35.7)
MCV RBC AUTO: 93 FL (ref 79–97)
MONOCYTES # BLD AUTO: 0.6 X10E3/UL (ref 0.1–0.9)
MONOCYTES NFR BLD AUTO: 7 %
NEUTROPHILS # BLD AUTO: 4.3 X10E3/UL (ref 1.4–7)
NEUTROPHILS NFR BLD AUTO: 51 %
NT-PROBNP SERPL-MCNC: <36 PG/ML (ref 0–130)
PLATELET # BLD AUTO: 324 X10E3/UL (ref 150–450)
POTASSIUM SERPL-SCNC: 4.2 MMOL/L (ref 3.5–5.2)
RBC # BLD AUTO: 5.11 X10E6/UL (ref 3.77–5.28)
SODIUM SERPL-SCNC: 139 MMOL/L (ref 134–144)
TSH SERPL DL<=0.005 MIU/L-ACNC: 3.13 UIU/ML (ref 0.45–4.5)
WBC # BLD AUTO: 8.4 X10E3/UL (ref 3.4–10.8)

## (undated) DEVICE — STERILE POLYISOPRENE POWDER-FREE SURGICAL GLOVES: Brand: PROTEXIS

## (undated) DEVICE — SUTURE VCRL SZ 0 L36IN ABSRB VLT L40MM CT 1/2 CIR J358H

## (undated) DEVICE — C-SECTION II-LF: Brand: MEDLINE INDUSTRIES, INC.

## (undated) DEVICE — (D)GLOVE SURG ORTH 6.5 PWD LTX -- DISC BY MFR USE ITEM 278012

## (undated) DEVICE — (D)PREP SKN CHLRAPRP APPL 26ML -- CONVERT TO ITEM 371833

## (undated) DEVICE — SPONGE LAP 18X18IN STRL -- 5/PK

## (undated) DEVICE — (D)STRIP SKN CLSR 0.5X4IN WHT --

## (undated) DEVICE — 3000CC GUARDIAN II: Brand: GUARDIAN

## (undated) DEVICE — SOLUTION IV 1000ML 0.9% SOD CHL

## (undated) DEVICE — TRAY CATH OD16FR SIL URIN M STATLOK STBL DEV SURSTP

## (undated) DEVICE — BLADE SURG UNIV BLUE --

## (undated) DEVICE — TIP CLEANER: Brand: VALLEYLAB

## (undated) DEVICE — REM POLYHESIVE ADULT PATIENT RETURN ELECTRODE: Brand: VALLEYLAB

## (undated) DEVICE — RESUSCITATION KIT T PC NEONATAL 60 MM ADJ MASK DISP

## (undated) DEVICE — SLEEVE COMPR STD 12 IN FOR 165IN CALF COMFORT VENODYNE SYS

## (undated) DEVICE — SOLIDIFIER FLUID 3000 CC ABSORB

## (undated) DEVICE — STAPLER SKIN H3.9MM WIRE DIA0.58MM CRWN 6.9MM 35 STPL ROT

## (undated) DEVICE — 3M™ MEDIPORE™ H SOFT CLOTH SURGICAL TAPE, 2863, 3 IN X 10 YD, 12/CASE: Brand: 3M™ MEDIPORE™

## (undated) DEVICE — KENDALL SCD EXPRESS SLEEVES, KNEE LENGTH, MEDIUM: Brand: KENDALL SCD

## (undated) DEVICE — BARRIER TISS ADH ABSRB 3X4IN -- GYNECARE INTERCEED

## (undated) DEVICE — BULB SYRINGE, IRRIGATION WITH PROTECTIVE CAP, 60 CC, INDIVIDUALLY WRAPPED: Brand: DOVER

## (undated) DEVICE — TELFA ADHESIVE ISLAND DRESSING: Brand: TELFA

## (undated) DEVICE — SUTURE PDS II SZ 0 L60IN ABSRB VLT L48MM CTX 1/2 CIR Z990G

## (undated) DEVICE — 1200 GUARD II KIT W/5MM TUBE W/O VAC TUBE: Brand: GUARDIAN

## (undated) DEVICE — SUTURE VCRL 3-0 L36IN ABSRB UD CT L40MM 1/2 CIR TAPERPOINT J956H

## (undated) DEVICE — ROCKER SWITCH PENCIL HOLSTER: Brand: VALLEYLAB

## (undated) DEVICE — SUTURE VCRL SZ 2-0 L36IN ABSRB UD L36MM CT-1 1/2 CIR J945H

## (undated) DEVICE — TOWEL,OR,DSP,ST,BLUE,STD,2/PK,40PK/CS: Brand: MEDLINE